# Patient Record
Sex: FEMALE | Race: WHITE | NOT HISPANIC OR LATINO | Employment: FULL TIME | URBAN - METROPOLITAN AREA
[De-identification: names, ages, dates, MRNs, and addresses within clinical notes are randomized per-mention and may not be internally consistent; named-entity substitution may affect disease eponyms.]

---

## 2024-06-27 ENCOUNTER — TRANSCRIBE ORDERS (OUTPATIENT)
Dept: ADMINISTRATIVE | Facility: HOSPITAL | Age: 44
End: 2024-06-27
Payer: COMMERCIAL

## 2024-06-27 DIAGNOSIS — N63.24 MASS OF LOWER INNER QUADRANT OF LEFT BREAST: Primary | ICD-10-CM

## 2024-06-28 ENCOUNTER — HOSPITAL ENCOUNTER (OUTPATIENT)
Dept: ULTRASOUND IMAGING | Facility: HOSPITAL | Age: 44
Discharge: HOME | End: 2024-06-28
Payer: COMMERCIAL

## 2024-06-28 ENCOUNTER — HOSPITAL ENCOUNTER (OUTPATIENT)
Dept: MAMMOGRAPHY | Facility: HOSPITAL | Age: 44
End: 2024-06-28
Payer: COMMERCIAL

## 2024-06-28 ENCOUNTER — HOSPITAL ENCOUNTER (OUTPATIENT)
Dept: MAMMOGRAPHY | Facility: HOSPITAL | Age: 44
Discharge: HOME | End: 2024-06-28
Payer: COMMERCIAL

## 2024-06-28 DIAGNOSIS — N63.24 MASS OF LOWER INNER QUADRANT OF LEFT BREAST: ICD-10-CM

## 2024-06-28 DIAGNOSIS — N63.24 UNSPECIFIED LUMP IN THE LEFT BREAST, LOWER INNER QUADRANT: ICD-10-CM

## 2024-06-28 PROCEDURE — 76642 ULTRASOUND BREAST LIMITED: CPT

## 2024-06-28 PROCEDURE — 77066 DX MAMMO INCL CAD BI: CPT

## 2024-06-28 PROCEDURE — G0279 TOMOSYNTHESIS, MAMMO: HCPCS

## 2024-11-27 ENCOUNTER — LAB (OUTPATIENT)
Dept: LAB | Facility: HOSPITAL | Age: 44
End: 2024-11-27
Payer: COMMERCIAL

## 2024-11-27 ENCOUNTER — OFFICE VISIT (OUTPATIENT)
Dept: FAMILY MEDICINE CLINIC | Facility: CLINIC | Age: 44
End: 2024-11-27
Payer: COMMERCIAL

## 2024-11-27 VITALS
WEIGHT: 293 LBS | OXYGEN SATURATION: 98 % | SYSTOLIC BLOOD PRESSURE: 116 MMHG | HEART RATE: 91 BPM | HEIGHT: 69 IN | DIASTOLIC BLOOD PRESSURE: 62 MMHG | BODY MASS INDEX: 43.4 KG/M2

## 2024-11-27 DIAGNOSIS — I10 HYPERTENSION, UNSPECIFIED TYPE: Primary | ICD-10-CM

## 2024-11-27 DIAGNOSIS — E66.813 CLASS 3 SEVERE OBESITY WITH SERIOUS COMORBIDITY AND BODY MASS INDEX (BMI) OF 40.0 TO 44.9 IN ADULT, UNSPECIFIED OBESITY TYPE: ICD-10-CM

## 2024-11-27 DIAGNOSIS — N63.20 MASS OF LEFT BREAST, UNSPECIFIED QUADRANT: ICD-10-CM

## 2024-11-27 DIAGNOSIS — R73.03 PREDIABETES: ICD-10-CM

## 2024-11-27 DIAGNOSIS — G89.29 OTHER CHRONIC PAIN: ICD-10-CM

## 2024-11-27 DIAGNOSIS — E66.01 CLASS 3 SEVERE OBESITY WITH SERIOUS COMORBIDITY AND BODY MASS INDEX (BMI) OF 40.0 TO 44.9 IN ADULT, UNSPECIFIED OBESITY TYPE: ICD-10-CM

## 2024-11-27 DIAGNOSIS — N64.4 BREAST TENDERNESS IN FEMALE: ICD-10-CM

## 2024-11-27 PROBLEM — E11.9 TYPE 2 DIABETES MELLITUS WITHOUT COMPLICATION: Status: ACTIVE | Noted: 2024-11-27

## 2024-11-27 LAB — HBA1C MFR BLD: 5.7 % (ref 4.8–5.6)

## 2024-11-27 PROCEDURE — 83036 HEMOGLOBIN GLYCOSYLATED A1C: CPT

## 2024-11-27 PROCEDURE — 36415 COLL VENOUS BLD VENIPUNCTURE: CPT

## 2024-11-27 RX ORDER — IBUPROFEN 800 MG/1
800 TABLET, FILM COATED ORAL EVERY 8 HOURS PRN
Qty: 30 TABLET | Refills: 1 | Status: SHIPPED | OUTPATIENT
Start: 2024-11-27

## 2024-11-27 RX ORDER — HYDROCHLOROTHIAZIDE 25 MG/1
25 TABLET ORAL DAILY
COMMUNITY
Start: 2024-07-23 | End: 2025-01-16

## 2024-11-27 RX ORDER — LORATADINE 10 MG/1
1 TABLET ORAL DAILY
COMMUNITY

## 2024-11-27 RX ORDER — LISINOPRIL 40 MG/1
40 TABLET ORAL DAILY
COMMUNITY
Start: 2024-10-18

## 2024-11-27 NOTE — ASSESSMENT & PLAN NOTE
Patient's (Body mass index is 44.6 kg/m².) indicates that they are morbidly/severely obese (BMI > 40 or > 35 with obesity - related health condition) with health conditions that include hypertension . Weight is improving with lifestyle modifications. BMI  is above average; BMI management plan is completed. We discussed portion control and increasing exercise.

## 2024-11-27 NOTE — PROGRESS NOTES
Chief Complaint  Establish Care and Hypertension    SUBJECTIVE  Abigail Jaimes presents to [facility] to establish care, pt previously seen [facility].     Patient has history of hypertension, well-controlled.      Pt states she has been dieting for the last approx 3 months and has lost about 26lbs. she started this after being told that she was prediabetic, requesting to recheck lab    Pt states that she had first mammo- diagnostic in June of this year, after finding a nodule while doing self breast exam, was negative but has had no further follow-up.  Patient notes that she has not checked to see if the lump has resolved because she has not really thought about it since she was told the mammogram was negative.    Patient requesting refill of ibuprofen 800 mg as needed, states she does not take this very often, only when working and typically only takes once daily.  Once she has been on her feet working for several hours she will start to have some joint pain    History of Present Illness  Past Medical History:   Diagnosis Date    Anxiety     Depression     Hypertension     Varicose vein of leg       Family History   Problem Relation Age of Onset    No Known Problems Mother     No Known Problems Father     No Known Problems Sister     No Known Problems Brother     No Known Problems Son     No Known Problems Daughter     No Known Problems Maternal Grandmother     No Known Problems Maternal Grandfather     No Known Problems Paternal Grandmother     No Known Problems Paternal Grandfather     No Known Problems Cousin     No Known Problems Other     Rheum arthritis Neg Hx     Osteoarthritis Neg Hx     Asthma Neg Hx     Diabetes Neg Hx     Heart failure Neg Hx     Hyperlipidemia Neg Hx     Hypertension Neg Hx     Migraines Neg Hx     Rashes / Skin problems Neg Hx     Seizures Neg Hx     Stroke Neg Hx     Thyroid disease Neg Hx       Past Surgical History:   Procedure Laterality  "Date    DENTAL PROCEDURE      HEMORRHOIDECTOMY      HYSTERECTOMY  2013    HYSTERECTOMY      STOMACH SURGERY          Current Outpatient Medications:     Cholecalciferol (vitamin D3) 125 MCG (5000 UT) tablet tablet, Take 1 tablet by mouth Daily., Disp: , Rfl:     hydroCHLOROthiazide 25 MG tablet, Take 1 tablet by mouth Daily., Disp: , Rfl:     lisinopril (PRINIVIL,ZESTRIL) 40 MG tablet, Take 1 tablet by mouth Daily., Disp: , Rfl:     loratadine (Claritin) 10 MG tablet, Take 1 tablet by mouth Daily., Disp: , Rfl:     ibuprofen (ADVIL,MOTRIN) 800 MG tablet, Take 1 tablet by mouth Every 8 (Eight) Hours As Needed for Moderate Pain., Disp: 30 tablet, Rfl: 1    OBJECTIVE  Vital Signs:   /62   Pulse 91   Ht 175.3 cm (69\")   Wt (!) 137 kg (302 lb)   SpO2 98%   BMI 44.60 kg/m²    Estimated body mass index is 44.6 kg/m² as calculated from the following:    Height as of this encounter: 175.3 cm (69\").    Weight as of this encounter: 137 kg (302 lb).     Wt Readings from Last 3 Encounters:   11/27/24 (!) 137 kg (302 lb)   11/28/22 130 kg (287 lb 6.4 oz)   02/11/22 (!) 142 kg (313 lb)     BP Readings from Last 3 Encounters:   11/27/24 116/62   11/28/22 148/90   02/09/22 (!) 163/102       Physical Exam  Vitals reviewed.   Constitutional:       Appearance: Normal appearance. She is well-developed.   HENT:      Head: Normocephalic and atraumatic.      Right Ear: External ear normal.      Left Ear: External ear normal.   Eyes:      Conjunctiva/sclera: Conjunctivae normal.      Pupils: Pupils are equal, round, and reactive to light.   Cardiovascular:      Rate and Rhythm: Normal rate and regular rhythm.      Heart sounds: No murmur heard.     No friction rub. No gallop.   Pulmonary:      Effort: Pulmonary effort is normal.      Breath sounds: Normal breath sounds. No wheezing or rhonchi.   Chest:      Comments: 2x 2 cm nodule palpable at approximately 9:00 left breast, tender to palpation  Skin:     General: Skin is warm " and dry.   Neurological:      Mental Status: She is alert and oriented to person, place, and time.      Cranial Nerves: No cranial nerve deficit.   Psychiatric:         Mood and Affect: Mood and affect normal.         Behavior: Behavior normal.         Thought Content: Thought content normal.         Judgment: Judgment normal.          Result Review              No Images in the past 120 days found..     The above data has been reviewed by ELISEO Miguel 11/27/2024 11:09 EST.          Patient Care Team:  Shamika Mcdonald APRN as PCP - General (Nurse Practitioner)  [facility]uf[facility] Carrillo (Pharmacy)       ASSESSMENT & PLAN    Diagnoses and all orders for this visit:    1. Hypertension, unspecified type (Primary)  Assessment & Plan:  Stable well-controlled at present, continue current medication      2. Prediabetes  -     Hemoglobin A1c; Future    3. Other chronic pain  Overview:  Stable and well-controlled with as needed use of ibuprofen, continue current medication    Orders:  -     ibuprofen (ADVIL,MOTRIN) 800 MG tablet; Take 1 tablet by mouth Every 8 (Eight) Hours As Needed for Moderate Pain.  Dispense: 30 tablet; Refill: 1    4. Class 3 severe obesity with serious comorbidity and body mass index (BMI) of 40.0 to 44.9 in adult, unspecified obesity type  Assessment & Plan:  Patient's (Body mass index is 44.6 kg/m².) indicates that they are morbidly/severely obese (BMI > 40 or > 35 with obesity - related health condition) with health conditions that include hypertension . Weight is improving with lifestyle modifications. BMI  is above average; BMI management plan is completed. We discussed portion control and increasing exercise.       5. Mass of left breast, unspecified quadrant  -     Mammo Diagnostic Digital Tomosynthesis Left With CAD; Future  -     US Breast Left Limited; Future    6. Breast tenderness in female  -     Mammo Diagnostic Digital Tomosynthesis Left With CAD; Future  -     US Breast Left  Limited; Future    After evaluating breast nodule patient very concerned as she reports the nodule was not previously tender and is now quite tender to touch, after discussion, given the changes we have opted to proceed with imaging for further evaluation, discussed even if imaging is normal given that this is a palpable abnormality I would recommend follow-up with breast surgeon, patient verbalized understanding, awaiting results of imaging    Tobacco Use: Medium Risk (11/27/2024)    Patient History     Smoking Tobacco Use: Former     Smokeless Tobacco Use: Never     Passive Exposure: Past       Follow Up     Return in 4 weeks (on 12/25/2024), or if symptoms worsen or fail to improve.        Patient was given instructions and counseling regarding her condition or for health maintenance advice. Please see specific information pulled into the AVS if appropriate.   I have reviewed information obtained and documented by others and I have confirmed the accuracy of this documented note.    Shamika Mcdonald, ELISEO

## 2024-12-11 ENCOUNTER — TELEPHONE (OUTPATIENT)
Dept: FAMILY MEDICINE CLINIC | Facility: CLINIC | Age: 44
End: 2024-12-11
Payer: COMMERCIAL

## 2024-12-11 NOTE — TELEPHONE ENCOUNTER
Pt states its the same breast, it hurts where that lump is. It is effecting her pushing and pulling at her job

## 2024-12-11 NOTE — TELEPHONE ENCOUNTER
Need clarification, is patient saying that she is now having pain in both breasts?  If yes we need to add a diagnostic mammogram/ultrasound for the right breast as well   negative...

## 2024-12-11 NOTE — TELEPHONE ENCOUNTER
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 SHE WOULD LIKE CLINICAL ADVICE ON WHAT TO DO.

## 2024-12-20 ENCOUNTER — HOSPITAL ENCOUNTER (OUTPATIENT)
Dept: MAMMOGRAPHY | Facility: HOSPITAL | Age: 44
Discharge: HOME | End: 2024-12-20
Payer: COMMERCIAL

## 2024-12-20 ENCOUNTER — HOSPITAL ENCOUNTER (OUTPATIENT)
Dept: ULTRASOUND IMAGING | Facility: HOSPITAL | Age: 44
Discharge: HOME | End: 2024-12-20
Payer: COMMERCIAL

## 2024-12-20 DIAGNOSIS — N64.4 BREAST TENDERNESS IN FEMALE: ICD-10-CM

## 2024-12-20 DIAGNOSIS — N63.20 MASS OF LEFT BREAST, UNSPECIFIED QUADRANT: ICD-10-CM

## 2024-12-20 PROCEDURE — G0279 TOMOSYNTHESIS, MAMMO: HCPCS

## 2024-12-20 PROCEDURE — 76642 ULTRASOUND BREAST LIMITED: CPT

## 2024-12-20 PROCEDURE — 77065 DX MAMMO INCL CAD UNI: CPT

## 2024-12-23 DIAGNOSIS — N64.4 BREAST TENDERNESS IN FEMALE: ICD-10-CM

## 2024-12-23 DIAGNOSIS — N63.20 MASS OF LEFT BREAST, UNSPECIFIED QUADRANT: Primary | ICD-10-CM

## 2025-01-10 NOTE — TELEPHONE ENCOUNTER
Caller: Abigail Jaimes    Relationship: Self    Best call back number: [phone]    Requested Prescriptions:   Requested Prescriptions     Pending Prescriptions Disp Refills    loratadine (Claritin) 10 MG tablet       Sig: Take 1 tablet by mouth Daily.    lisinopril (PRINIVIL,ZESTRIL) 40 MG tablet       Sig: Take 1 tablet by mouth Daily.    hydroCHLOROthiazide 25 MG tablet 30 tablet 5     Sig: Take 1 tablet by mouth Daily for 177 days.    Cholecalciferol (vitamin D3) 125 MCG (5000 UT) tablet tablet 30 tablet      Sig: Take 1 tablet by mouth Daily.        Pharm[facility] where request should be sent[facility] [place] [facility] [place] [facility] [address] [facility] [address] [phone] [facility] [phone] FX     Last office visit with prescribing clinician: 11/27/2024   Last telemedicine visit with prescribing clinician: Visit date not found   Next office visit with prescribing clinician: Visit date not found       Does the patient have less than a 3 day supply:  [] Yes  [x] No    Would you like a call back once the refill request has been completed: [] Yes [x] No    If the office needs to give you a call back, can they leave a voicemail: [] Yes [x] No    Jac Lakhani Rep   01/10/25 09:22 EST

## 2025-01-13 RX ORDER — LORATADINE 10 MG/1
10 TABLET ORAL DAILY
Qty: 30 TABLET | Refills: 1 | Status: SHIPPED | OUTPATIENT
Start: 2025-01-13

## 2025-01-13 RX ORDER — LISINOPRIL 40 MG/1
40 TABLET ORAL DAILY
Qty: 30 TABLET | Refills: 1 | Status: SHIPPED | OUTPATIENT
Start: 2025-01-13

## 2025-01-13 RX ORDER — HYDROCHLOROTHIAZIDE 25 MG/1
25 TABLET ORAL DAILY
Qty: 30 TABLET | Refills: 1 | Status: SHIPPED | OUTPATIENT
Start: 2025-01-13

## 2025-01-16 DIAGNOSIS — G89.29 OTHER CHRONIC PAIN: ICD-10-CM

## 2025-01-16 RX ORDER — IBUPROFEN 800 MG/1
800 TABLET, FILM COATED ORAL EVERY 8 HOURS PRN
Qty: 30 TABLET | Refills: 1 | Status: SHIPPED | OUTPATIENT
Start: 2025-01-16

## 2025-02-03 ENCOUNTER — HOSPITAL ENCOUNTER (EMERGENCY)
Facility: HOSPITAL | Age: 45
Discharge: HOME | End: 2025-02-04
Attending: EMERGENCY MEDICINE | Admitting: EMERGENCY MEDICINE
Payer: COMMERCIAL

## 2025-02-03 ENCOUNTER — APPOINTMENT (OUTPATIENT)
Dept: GENERAL RADIOLOGY | Facility: HOSPITAL | Age: 45
End: 2025-02-03
Payer: COMMERCIAL

## 2025-02-03 ENCOUNTER — APPOINTMENT (OUTPATIENT)
Dept: CT IMAGING | Facility: HOSPITAL | Age: 45
End: 2025-02-03
Payer: COMMERCIAL

## 2025-02-03 DIAGNOSIS — R06.02 SOB (SHORTNESS OF BREATH): ICD-10-CM

## 2025-02-03 DIAGNOSIS — K59.00 CONSTIPATION, UNSPECIFIED CONSTIPATION TYPE: Primary | ICD-10-CM

## 2025-02-03 DIAGNOSIS — M54.6 THORACIC BACK PAIN, UNSPECIFIED BACK PAIN LATERALITY, UNSPECIFIED CHRONICITY: ICD-10-CM

## 2025-02-03 LAB
ALBUMIN SERPL-MCNC: 4.1 G/DL (ref 3.5–5.2)
ALBUMIN/GLOB SERPL: 1.1 G/DL
ALP SERPL-CCNC: 139 U/L (ref 39–117)
ALT SERPL W P-5'-P-CCNC: 25 U/L (ref 1–33)
ANION GAP SERPL CALCULATED.3IONS-SCNC: 10.7 MMOL/L (ref 5–15)
AST SERPL-CCNC: 23 U/L (ref 1–32)
BASOPHILS # BLD AUTO: 0.02 10*3/MM3 (ref 0–0.2)
BASOPHILS NFR BLD AUTO: 0.2 % (ref 0–1.5)
BILIRUB SERPL-MCNC: 0.6 MG/DL (ref 0–1.2)
BILIRUB UR QL STRIP: NEGATIVE
BUN SERPL-MCNC: 18 MG/DL (ref 6–20)
BUN/CREAT SERPL: 16.4 (ref 7–25)
CALCIUM SPEC-SCNC: 9.6 MG/DL (ref 8.6–10.5)
CHLORIDE SERPL-SCNC: 100 MMOL/L (ref 98–107)
CLARITY UR: CLEAR
CO2 SERPL-SCNC: 27.3 MMOL/L (ref 22–29)
COLOR UR: YELLOW
CREAT SERPL-MCNC: 1.1 MG/DL (ref 0.57–1)
DEPRECATED RDW RBC AUTO: 42.7 FL (ref 37–54)
EGFRCR SERPLBLD CKD-EPI 2021: 64.1 ML/MIN/1.73
EOSINOPHIL # BLD AUTO: 0.12 10*3/MM3 (ref 0–0.4)
EOSINOPHIL NFR BLD AUTO: 1.1 % (ref 0.3–6.2)
ERYTHROCYTE [DISTWIDTH] IN BLOOD BY AUTOMATED COUNT: 13.8 % (ref 12.3–15.4)
FLUAV SUBTYP SPEC NAA+PROBE: NOT DETECTED
FLUBV RNA ISLT QL NAA+PROBE: NOT DETECTED
GLOBULIN UR ELPH-MCNC: 3.7 GM/DL
GLUCOSE SERPL-MCNC: 126 MG/DL (ref 65–99)
GLUCOSE UR STRIP-MCNC: NEGATIVE MG/DL
HCT VFR BLD AUTO: 42.1 % (ref 34–46.6)
HGB BLD-MCNC: 13.2 G/DL (ref 12–15.9)
HGB UR QL STRIP.AUTO: NEGATIVE
HOLD SPECIMEN: NORMAL
HOLD SPECIMEN: NORMAL
IMM GRANULOCYTES # BLD AUTO: 0.03 10*3/MM3 (ref 0–0.05)
IMM GRANULOCYTES NFR BLD AUTO: 0.3 % (ref 0–0.5)
KETONES UR QL STRIP: NEGATIVE
LEUKOCYTE ESTERASE UR QL STRIP.AUTO: NEGATIVE
LIPASE SERPL-CCNC: 44 U/L (ref 13–60)
LYMPHOCYTES # BLD AUTO: 2.05 10*3/MM3 (ref 0.7–3.1)
LYMPHOCYTES NFR BLD AUTO: 18.5 % (ref 19.6–45.3)
MCH RBC QN AUTO: 26.6 PG (ref 26.6–33)
MCHC RBC AUTO-ENTMCNC: 31.4 G/DL (ref 31.5–35.7)
MCV RBC AUTO: 84.7 FL (ref 79–97)
MONOCYTES # BLD AUTO: 0.77 10*3/MM3 (ref 0.1–0.9)
MONOCYTES NFR BLD AUTO: 7 % (ref 5–12)
NEUTROPHILS NFR BLD AUTO: 72.9 % (ref 42.7–76)
NEUTROPHILS NFR BLD AUTO: 8.07 10*3/MM3 (ref 1.7–7)
NITRITE UR QL STRIP: NEGATIVE
NRBC BLD AUTO-RTO: 0 /100 WBC (ref 0–0.2)
PH UR STRIP.AUTO: 6.5 [PH] (ref 5–8)
PLATELET # BLD AUTO: 275 10*3/MM3 (ref 140–450)
PMV BLD AUTO: 11.4 FL (ref 6–12)
POTASSIUM SERPL-SCNC: 3.3 MMOL/L (ref 3.5–5.2)
PROT SERPL-MCNC: 7.8 G/DL (ref 6–8.5)
PROT UR QL STRIP: NEGATIVE
RBC # BLD AUTO: 4.97 10*6/MM3 (ref 3.77–5.28)
RSV RNA NPH QL NAA+NON-PROBE: NOT DETECTED
SARS-COV-2 RNA RESP QL NAA+PROBE: NOT DETECTED
SODIUM SERPL-SCNC: 138 MMOL/L (ref 136–145)
SP GR UR STRIP: 1.01 (ref 1–1.03)
UROBILINOGEN UR QL STRIP: NORMAL
WBC NRBC COR # BLD AUTO: 11.06 10*3/MM3 (ref 3.4–10.8)
WHOLE BLOOD HOLD COAG: NORMAL
WHOLE BLOOD HOLD SPECIMEN: NORMAL

## 2025-02-03 PROCEDURE — 99285 EMERGENCY DEPT VISIT HI MDM: CPT

## 2025-02-03 PROCEDURE — 96374 THER/PROPH/DIAG INJ IV PUSH: CPT

## 2025-02-03 PROCEDURE — 83690 ASSAY OF LIPASE: CPT

## 2025-02-03 PROCEDURE — 87637 SARSCOV2&INF A&B&RSV AMP PRB: CPT

## 2025-02-03 PROCEDURE — 74018 RADEX ABDOMEN 1 VIEW: CPT

## 2025-02-03 PROCEDURE — 25010000002 KETOROLAC TROMETHAMINE PER 15 MG

## 2025-02-03 PROCEDURE — 96375 TX/PRO/DX INJ NEW DRUG ADDON: CPT

## 2025-02-03 PROCEDURE — 81003 URINALYSIS AUTO W/O SCOPE: CPT

## 2025-02-03 PROCEDURE — 80053 COMPREHEN METABOLIC PANEL: CPT

## 2025-02-03 PROCEDURE — 85025 COMPLETE CBC W/AUTO DIFF WBC: CPT

## 2025-02-03 PROCEDURE — 25010000002 ORPHENADRINE CITRATE PER 60 MG

## 2025-02-03 PROCEDURE — 36415 COLL VENOUS BLD VENIPUNCTURE: CPT

## 2025-02-03 RX ORDER — POTASSIUM CHLORIDE 750 MG/1
40 CAPSULE, EXTENDED RELEASE ORAL ONCE
Status: COMPLETED | OUTPATIENT
Start: 2025-02-03 | End: 2025-02-03

## 2025-02-03 RX ORDER — SODIUM CHLORIDE 0.9 % (FLUSH) 0.9 %
10 SYRINGE (ML) INJECTION AS NEEDED
Status: DISCONTINUED | OUTPATIENT
Start: 2025-02-03 | End: 2025-02-04 | Stop reason: HOSPADM

## 2025-02-03 RX ORDER — MAGNESIUM CARB/ALUMINUM HYDROX 105-160MG
150 TABLET,CHEWABLE ORAL ONCE
Status: COMPLETED | OUTPATIENT
Start: 2025-02-03 | End: 2025-02-03

## 2025-02-03 RX ORDER — ORPHENADRINE CITRATE 30 MG/ML
60 INJECTION INTRAMUSCULAR; INTRAVENOUS ONCE
Status: COMPLETED | OUTPATIENT
Start: 2025-02-03 | End: 2025-02-03

## 2025-02-03 RX ORDER — KETOROLAC TROMETHAMINE 30 MG/ML
30 INJECTION, SOLUTION INTRAMUSCULAR; INTRAVENOUS ONCE
Status: COMPLETED | OUTPATIENT
Start: 2025-02-03 | End: 2025-02-03

## 2025-02-03 RX ADMIN — POTASSIUM CHLORIDE 40 MEQ: 750 CAPSULE, EXTENDED RELEASE ORAL at 21:37

## 2025-02-03 RX ADMIN — KETOROLAC TROMETHAMINE 30 MG: 30 INJECTION, SOLUTION INTRAMUSCULAR; INTRAVENOUS at 21:37

## 2025-02-03 RX ADMIN — ORPHENADRINE CITRATE 60 MG: 60 INJECTION INTRAMUSCULAR; INTRAVENOUS at 21:37

## 2025-02-03 RX ADMIN — MAJOR MAGNESIUM CITRATE ORAL SOLUTION - LEMON 150 ML: 1.75 LIQUID ORAL at 23:26

## 2025-02-03 NOTE — ED PROVIDER NOTES
Time: 6:06 PM EST  Date of encounter:  2/3/2025  Independent Historian/Clinical History and Information was obtained by:   Patient    History is limited by: N/A    Chief Complaint   Patient presents with    Back Pain    Abdominal Pain         History of Present Illness:  Patient is a 44 y.o. year old female who presents to the emergency department for evaluation of back pain and abdominal pain.  Patient states that the abdominal pain is in her right and left lower quadrants.  Patient states that she feels like the pain is radiating into her mid back.  Patient has been constipated over the past couple days but took some MiraLAX and had a bowel movement that was claylike in consistency.  Patient is taking no medication for pain.(Bailey Seaver, APRN, FNP-C)    Patient states the back pain started today.  States that his bilateral upper back.  Worsening with deep breaths.  Patient recently went on a cruise and had to take a flight prior to the cruise.  He states she has not taken anything for the pain.  She states it does not feel like musculoskeletal pain.  Patient also has complaints of constipation.  States she has not had a bowel movement in 3 to 4 days.  Took 1 dose of Metamucil earlier today and then states she had cindy colored stools that are soft.  She denies vomiting, fever or cough.    Patient Care Team  Primary Care Provider: Shamika Mcdonald APRN    Past Medical History:     No Known Allergies  Past Medical History:   Diagnosis Date    Anxiety     Depression     Hypertension     Varicose vein of leg      Past Surgical History:   Procedure Laterality Date    DENTAL PROCEDURE      HEMORRHOIDECTOMY      HYSTERECTOMY  2013    HYSTERECTOMY      STOMACH SURGERY       Family History   Problem Relation Age of Onset    No Known Problems Mother     No Known Problems Father     No Known Problems Sister     No Known Problems Brother     No Known Problems Son     No Known Problems Daughter     No Known Problems  Maternal Grandmother     No Known Problems Maternal Grandfather     No Known Problems Paternal Grandmother     No Known Problems Paternal Grandfather     No Known Problems Cousin     No Known Problems Other     Rheum arthritis Neg Hx     Osteoarthritis Neg Hx     Asthma Neg Hx     Diabetes Neg Hx     Heart failure Neg Hx     Hyperlipidemia Neg Hx     Hypertension Neg Hx     Migraines Neg Hx     Rashes / Skin problems Neg Hx     Seizures Neg Hx     Stroke Neg Hx     Thyroid disease Neg Hx        Home Medications:  Prior to Admission medications    Medication Sig Start Date End Date Taking? Authorizing Provider   Cholecalciferol (vitamin D3) 125 MCG (5000 UT) tablet tablet Take 1 tablet by mouth Daily. 1/13/25   Shamika Mcdonald APRN   hydroCHLOROthiazide 25 MG tablet Take 1 tablet by mouth Daily. 1/13/25   Shamika Mcdonald APRN   ibuprofen (ADVIL,MOTRIN) 800 MG tablet TAKE ONE TABLET BY MOUTH EVERY 8 HOURS AS NEEDED FOR MODERATE PAIN 1/16/25   Shamika Mcdonald APRN   lisinopril (PRINIVIL,ZESTRIL) 40 MG tablet Take 1 tablet by mouth Daily. 1/13/25   Shamika Mcdonald APRN   loratadine (Claritin) 10 MG tablet Take 1 tablet by mouth Daily. 1/13/25   Shamika Mcdonald APRN        Social History:   Social History     Tobacco Use    Smoking status: Former     Types: Cigarettes     Passive exposure: Past    Smokeless tobacco: Never   Vaping Use    Vaping status: Every Day    Substances: Nicotine   Substance Use Topics    Alcohol use: Not Currently     Comment: IN RECOVERY    Drug use: Not Currently     Types: Methamphetamines     Comment: IN RECOVERY         Review of Systems:  Review of Systems   Respiratory:  Positive for shortness of breath.    Gastrointestinal:  Positive for abdominal pain and constipation. Negative for nausea and vomiting.   Musculoskeletal:  Positive for back pain.        Physical Exam:  /94 (Patient Position: Lying)   Pulse 75   Temp 97.7 °F (36.5 °C) (Oral)   Resp 20   Ht  "175.3 cm (69\")   Wt (!) 140 kg (309 lb 1.4 oz)   SpO2 98%   BMI 45.64 kg/m²         Physical Exam  Vitals and nursing note reviewed.   Constitutional:       General: She is not in acute distress.     Appearance: Normal appearance. She is obese. She is not ill-appearing, toxic-appearing or diaphoretic.   HENT:      Head: Normocephalic and atraumatic.      Nose: Nose normal.      Mouth/Throat:      Mouth: Mucous membranes are moist.   Eyes:      Extraocular Movements: Extraocular movements intact.      Conjunctiva/sclera: Conjunctivae normal.      Pupils: Pupils are equal, round, and reactive to light.   Cardiovascular:      Rate and Rhythm: Normal rate and regular rhythm.      Heart sounds: Normal heart sounds.   Pulmonary:      Effort: Pulmonary effort is normal. No tachypnea, accessory muscle usage or respiratory distress.      Breath sounds: Normal breath sounds. No decreased breath sounds, wheezing or rhonchi.   Abdominal:      General: Abdomen is flat. Bowel sounds are normal. There is no distension.      Palpations: Abdomen is soft.      Tenderness: There is no abdominal tenderness. There is no right CVA tenderness, left CVA tenderness, guarding or rebound.   Musculoskeletal:         General: Normal range of motion.      Cervical back: Normal, normal range of motion and neck supple.      Thoracic back: Normal.      Lumbar back: Normal.        Back:    Skin:     General: Skin is warm and dry.   Neurological:      General: No focal deficit present.      Mental Status: She is alert and oriented to person, place, and time.   Psychiatric:         Mood and Affect: Mood normal.         Behavior: Behavior normal.                        Medical Decision Making:      Comorbidities that affect care:    Hypertension, Obesity    External Notes reviewed:    Previous Clinic Note: Office visit November 27, 2024      The following orders were placed and all results were independently analyzed by me:  Orders Placed This " Encounter   Procedures    COVID-19, FLU A/B, RSV PCR 1 HR TAT - Swab, Nasopharynx    XR Abdomen KUB    CT Angiogram Chest Pulmonary Embolism    [facility] Draw    Comprehensive Metabolic Panel    Lipase    Urinalysis With Microscopic If Indicated (No Culture) - Urine, Clean Catch    CBC Auto Differential    Ambulatory Referral to Pulmonology    NPO Diet NPO Type: Strict NPO    Undress & Gown    Insert Peripheral IV    CBC & Differential    Green Top (Gel)    Lavender Top    Gold Top - SST    Light Blue Top       Medications Given in the Emergency Department:  Medications   sodium chloride 0.9 % flush 10 mL (10 mL Intravenous Given 2/4/25 0213)   docusate sodium (ENEMEEZ) enema 1 enema (has no administration in time range)   potassium chloride (MICRO-K/KLOR-CON) CR capsule (40 mEq Oral Given 2/3/25 2137)   ketorolac (TORADOL) injection 30 mg (30 mg Intravenous Given 2/3/25 2137)   orphenadrine (NORFLEX) injection 60 mg (60 mg Intravenous Given 2/3/25 2137)   magnesium citrate solution 150 mL (150 mL Oral Given 2/3/25 2326)   iopamidol (ISOVUE-370) 76 % injection 100 mL (100 mL Intravenous Given 2/4/25 0150)   cyclobenzaprine (FLEXERIL) tablet 10 mg (10 mg Oral Given 2/4/25 0212)   ketorolac (TORADOL) injection 30 mg (30 mg Intravenous Given 2/4/25 0212)        ED Course:    The patient was initially evaluated in the triage area where orders were placed. The patient was later dispositioned by Adelfo Salas PA-C.      The patient was advised to stay for completion of workup which includes but is not limited to communication of labs and radiological results, reassessment and plan. The patient was advised that leaving prior to disposition by a provider could result in critical findings that are not communicated to the patient.     ED Course as of 02/04/25 0258 Mon Feb 03, 2025 1807 PROVIDER IN TRIAGE  Patient was evaluated by me in triage, Bailey Seaver, APRN, FNP-C.  Orders were placed and patient is currently  awaiting final results and disposition.   [AS]   Tue Feb 04, 2025 0229 Dr. Abbott, radiologist called and states patient does not have any saddle emboli but possible right upper tiny emboli.  If kidney function adequate can reCT patient first treat for PE. [AJ]      ED Course User Index  [AJ] Adelfo Salas PA-C  [AS] Seaver, Alyce B, ELISEO       Labs:    Lab Results (last 24 hours)       Procedure Component Value Units Date/Time    CBC & Differential [[id number]]  (Abnormal) Collected: 02/03/25 1816    Specimen: Blood Updated: 02/03/25 1827    Narrative:      The following orders were created for panel order CBC & Differential.  Procedure                               Abnormality         Status                     ---------                               -----------         ------                     CBC Auto Differential[[id number]]        Abnormal            Final result                 Please view results for these tests on the individual orders.    Comprehensive Metabolic Panel [[id number]]  (Abnormal) Collected: 02/03/25 1816    Specimen: Blood Updated: 02/03/25 1851     Glucose 126 mg/dL      BUN 18 mg/dL      Creatinine 1.10 mg/dL      Sodium 138 mmol/L      Potassium 3.3 mmol/L      Chloride 100 mmol/L      CO2 27.3 mmol/L      Calcium 9.6 mg/dL      Total Protein 7.8 g/dL      Albumin 4.1 g/dL      ALT (SGPT) 25 U/L      AST (SGOT) 23 U/L      Alkaline Phosphatase 139 U/L      Total Bilirubin 0.6 mg/dL      Globulin 3.7 gm/dL      A/G Ratio 1.1 g/dL      BUN/Creatinine Ratio 16.4     Anion Gap 10.7 mmol/L      eGFR 64.1 mL/min/1.73     Narrative:      GFR Categories in Chronic Kidney Disease (CKD)      GFR Category          GFR (mL/min/1.73)    Interpretation  G1                     90 or greater         Normal or high (1)  G2                      60-89                Mild decrease (1)  G3a                   45-59                Mild to moderate decrease  G3b                   30-44                 Moderate to severe decrease  G4                    15-29                Severe decrease  G5                    14 or less           Kidney failure          (1)In the absence of evidence of kidney disease, neither GFR category G1 or G2 fulfill the criteria for CKD.    eGFR calculation 2021 CKD-EPI creatinine equation, which does not include race as a factor    Lipase [[id number]]  (Normal) Collected: 02/03/25 1816    Specimen: Blood Updated: 02/03/25 1851     Lipase 44 U/L     CBC Auto Differential [[id number]]  (Abnormal) Collected: 02/03/25 1816    Specimen: Blood Updated: 02/03/25 1827     WBC 11.06 10*3/mm3      RBC 4.97 10*6/mm3      Hemoglobin 13.2 g/dL      Hematocrit 42.1 %      MCV 84.7 fL      MCH 26.6 pg      MCHC 31.4 g/dL      RDW 13.8 %      RDW-SD 42.7 fl      MPV 11.4 fL      Platelets 275 10*3/mm3      Neutrophil % 72.9 %      Lymphocyte % 18.5 %      Monocyte % 7.0 %      Eosinophil % 1.1 %      Basophil % 0.2 %      Immature Grans % 0.3 %      Neutrophils, Absolute 8.07 10*3/mm3      Lymphocytes, Absolute 2.05 10*3/mm3      Monocytes, Absolute 0.77 10*3/mm3      Eosinophils, Absolute 0.12 10*3/mm3      Basophils, Absolute 0.02 10*3/mm3      Immature Grans, Absolute 0.03 10*3/mm3      nRBC 0.0 /100 WBC     Urinalysis With Microscopic If Indicated (No Culture) - Urine, Clean Catch [[id number]]  (Normal) Collected: 02/03/25 1820    Specimen: Urine, Clean Catch Updated: 02/03/25 1830     Color, UA Yellow     Appearance, UA Clear     pH, UA 6.5     Specific Gravity, UA 1.012     Glucose, UA Negative     Ketones, UA Negative     Bilirubin, UA Negative     Blood, UA Negative     Protein, UA Negative     Leuk Esterase, UA Negative     Nitrite, UA Negative     Urobilinogen, UA 1.0 E.U./dL    Narrative:      Urine microscopic not indicated.    COVID-19, FLU A/B, RSV PCR 1 HR TAT - Swab, Nasopharynx [[id number]]  (Normal) Collected: 02/03/25 2124    Specimen: Swab from Nasopharynx Updated: 02/03/25 2216      COVID19 Not Detected     Influenza A PCR Not Detected     Influenza B PCR Not Detected     RSV, PCR Not Detected    Narrative:      Fact sheet for providers: https://www.fda.gov/media/595078/download    Fact sheet for patients: https://www.fda.gov/media/507351/download    Test performed by PCR.             Imaging:    CT Angiogram Chest Pulmonary Embolism    Result Date: 2/4/2025  CT ANGIOGRAM CHEST PULMONARY EMBOLISM Date of Exam: 2/4/2025 1:33 AM EST Indication: sob/recent travel. Upper back pain. Comparison: None available. Technique: Axial CT images were obtained of the chest after the uneventful intravenous administration of iodinated contrast utilizing pulmonary embolism protocol.  Reconstructed coronal and sagittal images were also obtained. Automated exposure control and iterative construction methods were used. Findings: There is some artifact due to body habitus. Bolus timing is suboptimal as well. No central pulmonary emboli are identified. The subsegmental and more distal vessels are fairly poorly characterized on both sides of the chest. Small emboli in the right upper lobe cannot be completely excluded. If renal function permits, consider repeat exam or consider VQ scan. No aortic aneurysm or aortic dissection. No pleural or pericardial effusion. No adenopathy. No acute findings in the visualized upper abdomen. There are 2 adjacent subpleural nodules in the right lower lobe both measuring about 4 mm in size. The lungs are otherwise clear. No acute infiltrates. No pulmonary edema. No pneumothorax. No fracture or malalignment in the thoracic spine.     1. Suboptimal bolus timing complicated by body habitus. No central pulmonary emboli. Small right upper lobe pulmonary emboli cannot be completely excluded. Consider repeat exam if renal function permits or VQ scan if indicated. 2. No aortic aneurysm or dissection. 3. No acute pulmonary infiltrates. 4. 2 tiny right lower lobe pulmonary nodules.  Indeterminate solid pulmonary nodule measuring 4 mm. In a low-risk patient with a solid nodule <6 mm, recommend no follow-up. In a high-risk patient, CT at 12 months is optional with stronger consideration if there is suspicious nodule morphology and/or upper lobe location. NOTIFICATION: Critical Value/emergent results were called by telephone at the time of interpretation on 2/4/2025 2:29 AM EST to HEBERT MONTAGUE who verbally acknowledged these results. Electronically Signed: Marty Abbott MD  2/4/2025 2:29 AM EST  Workstation ID: [id number]    XR Abdomen KUB    Result Date: 2/3/2025  XR ABDOMEN KUB Date of Exam: 2/3/2025 6:49 PM EST Indication: constipation Comparison: None available. Findings: No evidence of bowel obstruction. Moderate colonic stool burden which can be seen with constipation. No acute osseous abnormality. No radiodense renal calculi appreciated. Lower lumbar spondylosis.     Impression: No evidence of bowel obstruction. Moderate colonic stool burden which can be seen with constipation. Electronically Signed: Julien Garcia MD  2/3/2025 7:58 PM EST  Workstation ID: [id number]       Differential Diagnosis and Discussion:      Abdominal Pain: Based on the patient's signs and symptoms, I considered abdominal aortic aneurysm, small bowel obstruction, pancreatitis, acute cholecystitis, acute appendecitis, peptic ulcer disease, gastritis, colitis, endocrine disorders, irritable bowel syndrome and other differential diagnosis an etiology of the patient's abdominal pain.  Back Pain: The patient presents with back pain. My differential diagnosis includes but is not limited to acute spinal epidural abscess, acute spinal epidural bleed, cauda equina syndrome, abdominal aortic aneurysm, aortic dissection, kidney stone, pyelonephritis, musculoskeletal back pain, spinal fracture, and osteoarthritis.   Dyspnea: Differential diagnosis includes but is not limited to metabolic acidosis, neurological disorders,  psychogenic, asthma, pneumothorax, upper airway obstruction, COPD, pneumonia, noncardiogenic pulmonary edema, interstitial lung disease, anemia, congestive heart failure, and pulmonary embolism    PROCEDURES:    Labs were collected in the emergency department and all labs were reviewed and interpreted by me.  X-ray were performed in the emergency department and all X-ray impressions were independently interpreted by me.  CT scan was performed in the emergency department and the CT scan radiology impression was interpreted by me.    No orders to display        Procedures    MDM     Amount and/or Complexity of Data Reviewed  Clinical lab tests: reviewed  Tests in the radiology section of CPT®: reviewed  Decide to obtain previous medical records or to obtain history from someone other than the patient: yes                     Patient Care Considerations:    ReCT and the patient.  However have discussed with the patient the findings of the original CT after discussing with Dr. Abbott and Dr. Ureña.  Patient is agreeable to hold off on CT and follow-up with PCP and return if any worsening symptoms.      Consultants/Shared Management Plan:    SHARED VISIT: I have discussed the case with my supervising physician, [facility] The substantive portion of the medical decision was made by the attesting physician who made or approve the management plan and will take responsibility for the patient.  Clinical findings were discussed and ultimate disposition was made in consult with supervising physician.    Social Determinants of Health:    Patient is independent, reliable, and has access to care.       Disposition and Care Coordination:    Discharged: I considered escalation of care by admitting this patient to the hospital, however patient is not hypoxic.  CTA negative for large emboli    I have explained the patient´s condition, diagnoses and treatment plan based on the information available  to me at this time. I have answered questions and addressed any concerns. The patient has a good  understanding of the patient´s diagnosis, condition, and treatment plan as can be expected at this point. The vital signs have been stable. The patient´s condition is stable and appropriate for discharge from the emergency department.      The patient will pursue further outpatient evaluation with the primary care physician or other designated or consulting physician as outlined in the discharge instructions. They are agreeable to this plan of care and follow-up instructions have been explained in detail. The patient has received these instructions in written format and has expressed an understanding of the discharge instructions. The patient is aware that any significant change in condition or worsening of symptoms should prompt an immediate return to this or the closest emergency department or call to 911.  I have explained discharge medications and the need for follow up with the patient/caretakers. This was also printed in the discharge instructions. Patient was discharged with the following medications and follow up:      Medication List        New Prescriptions      ketorolac 10 MG tablet  Commonly known as: TORADOL  Take 1 tablet by mouth Every 6 (Six) Hours As Needed for Moderate Pain.     lidocaine 5 %  Commonly known as: LIDODERM  Place 1 patch on the skin as directed by provider Daily. Remove & Discard patch within 12 hours or as directed by MD     methocarbamol 750 MG tablet  Commonly known as: ROBAXIN  Take 1 tablet by mouth 3 (Three) Times a Day As Needed for Muscle Spasms.               Where to Get Your Medications        These medications were [place] [facility] [place] [address] - [phone]  - [phone] FX  [address], [place] [address]      Phone: [phone]   ketorolac 10 MG tablet  lidocaine 5 %  methocarbamol 750 MG  Shamika Sabillon, APRN  [address]  [place] [address]  [phone]             Final diagnoses:   Constipation, unspecified constipation type   Thoracic back pain, unspecified back pain laterality, unspecified chronicity   SOB (shortness of breath)        ED Disposition       ED Disposition   Discharge    Condition   Stable    Comment   --               This medical record created using voice recognition software.             Adelfo Salas PA-C  02/04/25 0258

## 2025-02-03 NOTE — Clinical Note
[facility] EMERGENCY ROOM  [address] [place] [address]  Phone: [phone]  Fax: [phone]    Abigail Jaimes was seen and treated in our emergency department on 2/3/2025.  She may return to work on 02/06/2025.         Thank you for choosing [facility].    Adelfo Salas PA-C

## 2025-02-03 NOTE — ED TRIAGE NOTES
Pt to ED for back pain that starts mid way up and goes up, pt states she isn't able to sit, stand or lay comfortable. Started this morning. Pt is also having abdominal pain. Denies n/v/d. States that she fells full, not had a normal bowel movement today. New vitamin B medication, Lower abdominal Pain.

## 2025-02-04 ENCOUNTER — APPOINTMENT (OUTPATIENT)
Dept: CT IMAGING | Facility: HOSPITAL | Age: 45
End: 2025-02-04
Payer: COMMERCIAL

## 2025-02-04 VITALS
BODY MASS INDEX: 43.4 KG/M2 | HEIGHT: 69 IN | TEMPERATURE: 97.7 F | WEIGHT: 293 LBS | OXYGEN SATURATION: 98 % | HEART RATE: 75 BPM | RESPIRATION RATE: 20 BRPM | DIASTOLIC BLOOD PRESSURE: 94 MMHG | SYSTOLIC BLOOD PRESSURE: 141 MMHG

## 2025-02-04 PROCEDURE — 25510000001 IOPAMIDOL PER 1 ML: Performed by: EMERGENCY MEDICINE

## 2025-02-04 PROCEDURE — 96376 TX/PRO/DX INJ SAME DRUG ADON: CPT

## 2025-02-04 PROCEDURE — 71275 CT ANGIOGRAPHY CHEST: CPT

## 2025-02-04 PROCEDURE — 25010000002 KETOROLAC TROMETHAMINE PER 15 MG

## 2025-02-04 RX ORDER — KETOROLAC TROMETHAMINE 30 MG/ML
30 INJECTION, SOLUTION INTRAMUSCULAR; INTRAVENOUS ONCE
Status: COMPLETED | OUTPATIENT
Start: 2025-02-04 | End: 2025-02-04

## 2025-02-04 RX ORDER — LIDOCAINE 50 MG/G
1 PATCH TOPICAL EVERY 24 HOURS
Qty: 10 EACH | Refills: 0 | Status: SHIPPED | OUTPATIENT
Start: 2025-02-04

## 2025-02-04 RX ORDER — KETOROLAC TROMETHAMINE 10 MG/1
10 TABLET, FILM COATED ORAL EVERY 6 HOURS PRN
Qty: 15 TABLET | Refills: 0 | Status: SHIPPED | OUTPATIENT
Start: 2025-02-04 | End: 2025-02-06

## 2025-02-04 RX ORDER — CYCLOBENZAPRINE HCL 10 MG
10 TABLET ORAL ONCE
Status: COMPLETED | OUTPATIENT
Start: 2025-02-04 | End: 2025-02-04

## 2025-02-04 RX ORDER — METHOCARBAMOL 750 MG/1
750 TABLET, FILM COATED ORAL 3 TIMES DAILY PRN
Qty: 21 TABLET | Refills: 0 | Status: SHIPPED | OUTPATIENT
Start: 2025-02-04 | End: 2025-02-06

## 2025-02-04 RX ORDER — IOPAMIDOL 755 MG/ML
100 INJECTION, SOLUTION INTRAVASCULAR
Status: COMPLETED | OUTPATIENT
Start: 2025-02-04 | End: 2025-02-04

## 2025-02-04 RX ADMIN — CYCLOBENZAPRINE HYDROCHLORIDE 10 MG: 10 TABLET, FILM COATED ORAL at 02:12

## 2025-02-04 RX ADMIN — Medication 10 ML: at 02:13

## 2025-02-04 RX ADMIN — KETOROLAC TROMETHAMINE 30 MG: 30 INJECTION, SOLUTION INTRAMUSCULAR; INTRAVENOUS at 02:12

## 2025-02-04 RX ADMIN — IOPAMIDOL 100 ML: 755 INJECTION, SOLUTION INTRAVENOUS at 01:50

## 2025-02-04 NOTE — DISCHARGE INSTRUCTIONS
Your COVID and flu swab are negative  Your lab work does not show any concerning abnormalities  Your x-ray of your abdomen shows that you are constipated.  You have been given magnesium citrate during your ED visit.  This has onset of 30 minutes to 6 hours.  CT of your chest did not show a large blood clot however it is possible you might possibly have a small one that will resolve.  Please follow-up with your PCP and have also put in referral for pulmonology.    Is monitor your blood pressure while taking Toradol.

## 2025-02-06 ENCOUNTER — LAB (OUTPATIENT)
Dept: LAB | Facility: HOSPITAL | Age: 45
End: 2025-02-06
Payer: COMMERCIAL

## 2025-02-06 ENCOUNTER — OFFICE VISIT (OUTPATIENT)
Dept: FAMILY MEDICINE CLINIC | Facility: CLINIC | Age: 45
End: 2025-02-06
Payer: COMMERCIAL

## 2025-02-06 ENCOUNTER — HOSPITAL ENCOUNTER (OUTPATIENT)
Dept: GENERAL RADIOLOGY | Facility: HOSPITAL | Age: 45
Discharge: HOME | End: 2025-02-06
Payer: COMMERCIAL

## 2025-02-06 VITALS
SYSTOLIC BLOOD PRESSURE: 125 MMHG | HEART RATE: 86 BPM | WEIGHT: 293 LBS | HEIGHT: 69 IN | BODY MASS INDEX: 43.4 KG/M2 | DIASTOLIC BLOOD PRESSURE: 82 MMHG | OXYGEN SATURATION: 98 %

## 2025-02-06 DIAGNOSIS — R79.89 POSITIVE D DIMER: Primary | ICD-10-CM

## 2025-02-06 DIAGNOSIS — M54.6 THORACIC BACK PAIN, UNSPECIFIED BACK PAIN LATERALITY, UNSPECIFIED CHRONICITY: ICD-10-CM

## 2025-02-06 DIAGNOSIS — R93.89 ABNORMAL CT OF THE CHEST: ICD-10-CM

## 2025-02-06 DIAGNOSIS — D72.829 LEUKOCYTOSIS, UNSPECIFIED TYPE: ICD-10-CM

## 2025-02-06 DIAGNOSIS — R91.1 LUNG NODULE: ICD-10-CM

## 2025-02-06 DIAGNOSIS — M54.6 THORACIC BACK PAIN, UNSPECIFIED BACK PAIN LATERALITY, UNSPECIFIED CHRONICITY: Primary | ICD-10-CM

## 2025-02-06 DIAGNOSIS — E87.6 HYPOKALEMIA: ICD-10-CM

## 2025-02-06 LAB
ANION GAP SERPL CALCULATED.3IONS-SCNC: 7.2 MMOL/L (ref 5–15)
BASOPHILS # BLD AUTO: 0.02 10*3/MM3 (ref 0–0.2)
BASOPHILS NFR BLD AUTO: 0.3 % (ref 0–1.5)
BUN SERPL-MCNC: 21 MG/DL (ref 6–20)
BUN/CREAT SERPL: 21.9 (ref 7–25)
CALCIUM SPEC-SCNC: 9.3 MG/DL (ref 8.6–10.5)
CHLORIDE SERPL-SCNC: 104 MMOL/L (ref 98–107)
CO2 SERPL-SCNC: 28.8 MMOL/L (ref 22–29)
CREAT SERPL-MCNC: 0.96 MG/DL (ref 0.57–1)
D DIMER PPP FEU-MCNC: 0.73 MCGFEU/ML (ref 0–0.5)
DEPRECATED RDW RBC AUTO: 40.1 FL (ref 37–54)
EGFRCR SERPLBLD CKD-EPI 2021: 75.4 ML/MIN/1.73
EOSINOPHIL # BLD AUTO: 0.22 10*3/MM3 (ref 0–0.4)
EOSINOPHIL NFR BLD AUTO: 3.5 % (ref 0.3–6.2)
ERYTHROCYTE [DISTWIDTH] IN BLOOD BY AUTOMATED COUNT: 12.9 % (ref 12.3–15.4)
GLUCOSE SERPL-MCNC: 101 MG/DL (ref 65–99)
HCT VFR BLD AUTO: 40.5 % (ref 34–46.6)
HGB BLD-MCNC: 12.6 G/DL (ref 12–15.9)
IMM GRANULOCYTES # BLD AUTO: 0.02 10*3/MM3 (ref 0–0.05)
IMM GRANULOCYTES NFR BLD AUTO: 0.3 % (ref 0–0.5)
LYMPHOCYTES # BLD AUTO: 1.46 10*3/MM3 (ref 0.7–3.1)
LYMPHOCYTES NFR BLD AUTO: 23.2 % (ref 19.6–45.3)
MCH RBC QN AUTO: 26.5 PG (ref 26.6–33)
MCHC RBC AUTO-ENTMCNC: 31.1 G/DL (ref 31.5–35.7)
MCV RBC AUTO: 85.1 FL (ref 79–97)
MONOCYTES # BLD AUTO: 0.51 10*3/MM3 (ref 0.1–0.9)
MONOCYTES NFR BLD AUTO: 8.1 % (ref 5–12)
NEUTROPHILS NFR BLD AUTO: 4.07 10*3/MM3 (ref 1.7–7)
NEUTROPHILS NFR BLD AUTO: 64.6 % (ref 42.7–76)
NRBC BLD AUTO-RTO: 0 /100 WBC (ref 0–0.2)
PLATELET # BLD AUTO: 276 10*3/MM3 (ref 140–450)
PMV BLD AUTO: 12.5 FL (ref 6–12)
POTASSIUM SERPL-SCNC: 4.2 MMOL/L (ref 3.5–5.2)
RBC # BLD AUTO: 4.76 10*6/MM3 (ref 3.77–5.28)
SODIUM SERPL-SCNC: 140 MMOL/L (ref 136–145)
WBC NRBC COR # BLD AUTO: 6.3 10*3/MM3 (ref 3.4–10.8)

## 2025-02-06 PROCEDURE — 85025 COMPLETE CBC W/AUTO DIFF WBC: CPT

## 2025-02-06 PROCEDURE — 72072 X-RAY EXAM THORAC SPINE 3VWS: CPT

## 2025-02-06 PROCEDURE — 36415 COLL VENOUS BLD VENIPUNCTURE: CPT

## 2025-02-06 PROCEDURE — 99214 OFFICE O/P EST MOD 30 MIN: CPT | Performed by: NURSE PRACTITIONER

## 2025-02-06 PROCEDURE — 80048 BASIC METABOLIC PNL TOTAL CA: CPT

## 2025-02-06 PROCEDURE — 85379 FIBRIN DEGRADATION QUANT: CPT

## 2025-02-06 RX ORDER — DICLOFENAC SODIUM 75 MG/1
75 TABLET, DELAYED RELEASE ORAL 2 TIMES DAILY
Qty: 30 TABLET | Refills: 0 | Status: SHIPPED | OUTPATIENT
Start: 2025-02-06

## 2025-02-06 RX ORDER — CYCLOBENZAPRINE HCL 10 MG
10 TABLET ORAL 3 TIMES DAILY PRN
Qty: 21 TABLET | Refills: 0 | Status: SHIPPED | OUTPATIENT
Start: 2025-02-06

## 2025-02-06 NOTE — PROGRESS NOTES
Chief Complaint  Abdominal Pain and ER Follow Up    SUBJECTIVE  Abigail Jaimes presents to [facility] to follow up from recent ER visit for abdominal pain. Pt reports she is no longer having the stomach pain but is till having upper pain back pain. Constipation has improved.     Started with 4 days of constipation, got some miralax, but started having back pain that continued to get worse.  States no known injury, has not been doing any more than usual activity.  Patient's back pain and constipation got to the point that she went to the ER.    ER ordered CT chest for PE and could not rule out a possible small PE    Pt went on cruise Jan 17th-21st, flight was 2&1/2 hours. Pt has no personal hx of DVT/PE.     ER notes that patient was complaining of shortness of breath and pain with inspiration, pt states that she has no SOB, states that she never had any SOB at all, she doesn't know why it is documented that she did. Has not had any cough, has not had any chest pain.  She still denies shortness of breath at this time as well    States that her back pain has not improved at all.  Is taking the methocarbamol and ketorolac as directed but it has not helped, was unable to  the Lidoderm patches as insurance did not cover them    Patient reports that the pain seems to get worse when she lays back for a while      History of Present Illness  Past Medical History:   Diagnosis Date    Anxiety     Depression     Hypertension     Varicose vein of leg       Family History   Problem Relation Age of Onset    No Known Problems Mother     No Known Problems Father     No Known Problems Sister     No Known Problems Brother     No Known Problems Son     No Known Problems Daughter     No Known Problems Maternal Grandmother     No Known Problems Maternal Grandfather     No Known Problems Paternal Grandmother     No Known Problems Paternal Grandfather     No Known Problems Cousin     No Known  "Problems Other     Rheum arthritis Neg Hx     Osteoarthritis Neg Hx     Asthma Neg Hx     Diabetes Neg Hx     Heart failure Neg Hx     Hyperlipidemia Neg Hx     Hypertension Neg Hx     Migraines Neg Hx     Rashes / Skin problems Neg Hx     Seizures Neg Hx     Stroke Neg Hx     Thyroid disease Neg Hx       Past Surgical History:   Procedure Laterality Date    DENTAL PROCEDURE      HEMORRHOIDECTOMY      HYSTERECTOMY  2013    HYSTERECTOMY      STOMACH SURGERY          Current Outpatient Medications:     Cholecalciferol (vitamin D3) 125 MCG (5000 UT) tablet tablet, Take 1 tablet by mouth Daily., Disp: 30 tablet, Rfl: 1    hydroCHLOROthiazide 25 MG tablet, Take 1 tablet by mouth Daily., Disp: 30 tablet, Rfl: 1    lisinopril (PRINIVIL,ZESTRIL) 40 MG tablet, Take 1 tablet by mouth Daily., Disp: 30 tablet, Rfl: 1    loratadine (Claritin) 10 MG tablet, Take 1 tablet by mouth Daily., Disp: 30 tablet, Rfl: 1    cyclobenzaprine (FLEXERIL) 10 MG tablet, Take 1 tablet by mouth 3 (Three) Times a Day As Needed for Muscle Spasms., Disp: 21 tablet, Rfl: 0    diclofenac (VOLTAREN) 75 MG EC tablet, Take 1 tablet by mouth 2 (Two) Times a Day., Disp: 30 tablet, Rfl: 0    lidocaine (LIDODERM) 5 %, Place 1 patch on the skin as directed by provider Daily. Remove & Discard patch within 12 hours or as directed by MD (Patient not taking: Reported on 2/6/2025), Disp: 10 each, Rfl: 0    OBJECTIVE  Vital Signs:   /82   Pulse 86   Ht 175.3 cm (69\")   Wt (!) 140 kg (308 lb)   SpO2 98%   BMI 45.48 kg/m²    Estimated body mass index is 45.48 kg/m² as calculated from the following:    Height as of this encounter: 175.3 cm (69\").    Weight as of this encounter: 140 kg (308 lb).     Wt Readings from Last 3 Encounters:   02/06/25 (!) 140 kg (308 lb)   02/03/25 (!) 140 kg (309 lb 1.4 oz)   11/27/24 (!) 137 kg (302 lb)     BP Readings from Last 3 Encounters:   02/06/25 125/82   02/04/25 141/94   11/27/24 116/62       Physical Exam  Vitals " reviewed.   Constitutional:       Appearance: Normal appearance. She is well-developed.   HENT:      Head: Normocephalic and atraumatic.      Right Ear: External ear normal.      Left Ear: External ear normal.   Eyes:      Conjunctiva/sclera: Conjunctivae normal.      Pupils: Pupils are equal, round, and reactive to light.   Cardiovascular:      Rate and Rhythm: Normal rate and regular rhythm.      Heart sounds: No murmur heard.     No friction rub. No gallop.   Pulmonary:      Effort: Pulmonary effort is normal.      Breath sounds: Normal breath sounds. No wheezing or rhonchi.   Musculoskeletal:        Back:       Comments: Patient reporting pain in thoracic area with a couple spots of point tenderness, pain does not seem to worsen with range of motion.  No spinal tenderness   Skin:     General: Skin is warm and dry.   Neurological:      Mental Status: She is alert and oriented to person, place, and time.      Cranial Nerves: No cranial nerve deficit.   Psychiatric:         Mood and Affect: Mood and affect normal.         Behavior: Behavior normal.         Thought Content: Thought content normal.         Judgment: Judgment normal.          Result Review    CMP          2/3/2025    18:16   CMP   Glucose 126    BUN 18    Creatinine 1.10    EGFR 64.1    Sodium 138    Potassium 3.3    Chloride 100    Calcium 9.6    Total Protein 7.8    Albumin 4.1    Globulin 3.7    Total Bilirubin 0.6    Alkaline Phosphatase 139    AST (SGOT) 23    ALT (SGPT) 25    Albumin/Globulin Ratio 1.1    BUN/Creatinine Ratio 16.4    Anion Gap 10.7      CBC          2/3/2025    18:16   CBC   WBC 11.06    RBC 4.97    Hemoglobin 13.2    Hematocrit 42.1    MCV 84.7    MCH 26.6    MCHC 31.4    RDW 13.8    Platelets 275            CT Angiogram Chest Pulmonary Embolism    Result Date: 2/4/2025  1. Suboptimal bolus timing complicated by body habitus. No central pulmonary emboli. Small right upper lobe pulmonary emboli cannot be completely excluded.  Consider repeat exam if renal function permits or VQ scan if indicated. 2. No aortic aneurysm or dissection. 3. No acute pulmonary infiltrates. 4. 2 tiny right lower lobe pulmonary nodules. Indeterminate solid pulmonary nodule measuring 4 mm. In a low-risk patient with a solid nodule <6 mm, recommend no follow-up. In a high-risk patient, CT at 12 months is optional with stronger consideration if there is suspicious nodule morphology and/or upper lobe location. NOTIFICATION: Critical Value/emergent results were called by telephone at the time of interpretation on 2/4/2025 2:29 AM EST to HEBERT MONTAGUE who verbally acknowledged these results. Electronically Signed: Marty Abbott MD  2/4/2025 2:29 AM EST  Workstation ID: [id number]    XR Abdomen KUB    Result Date: 2/3/2025  Impression: No evidence of bowel obstruction. Moderate colonic stool burden which can be seen with constipation. Electronically Signed: Julien Garcia MD  2/3/2025 7:58 PM EST  Workstation ID: [id number]    Mammo Diagnostic Digital Tomosynthesis Left With CAD    Result Date: 12/20/2024   IMPRESSION: Diagnostic left mammogram and targeted left breast ultrasound demonstrating no abnormality.  I met with the ultrasound technologist and patient and discussed findings and recommendations. The patient is concerned that she feels a mass. Surgical consultation is recommended.  TISSUE DENSITY: The breasts are almost entirely fatty.  BI-RADS ASSESSMENT: BI-RADS 2. Benign findings.   Note: It has been reported that there is approximately a 15% false negative in mammography. Therefore, management of a palpable abnormality should not be deferred because of a negative mammogram.    Electronically Signed By-FERNANDA SETHI MD On:12/20/2024 1:34 PM      US Breast Left Limited    Result Date: 12/20/2024   IMPRESSION: Diagnostic left mammogram and targeted left breast ultrasound demonstrating no abnormality.  I met with the ultrasound technologist and patient and  discussed findings and recommendations. The patient is concerned that she feels a mass. Surgical consultation is recommended.  TISSUE DENSITY: The breasts are almost entirely fatty.  BI-RADS ASSESSMENT: BI-RADS 2. Benign findings.   Note: It has been reported that there is approximately a 15% false negative in mammography. Therefore, management of a palpable abnormality should not be deferred because of a negative mammogram.    Electronically Signed By-FERNANDA SETHI MD On:12/20/2024 1:34 PM         The above data has been reviewed by ELISEO Miguel 02/06/2025 08:01 EST.          Patient Care Team:  Shamika Mcdonald APRN as PCP - General (Nurse Practitioner)  [facility] (Pharmacy)            ASSESSMENT & PLAN    Diagnoses and all orders for this visit:    1. Thoracic back pain, unspecified back pain laterality, unspecified chronicity (Primary)  -     CBC w AUTO Differential; Future  -     Basic metabolic panel; Future  -     D-dimer, Quantitative; Future  -     XR Spine Thoracic 3 View; Future  -     cyclobenzaprine (FLEXERIL) 10 MG tablet; Take 1 tablet by mouth 3 (Three) Times a Day As Needed for Muscle Spasms.  Dispense: 21 tablet; Refill: 0  -     diclofenac (VOLTAREN) 75 MG EC tablet; Take 1 tablet by mouth 2 (Two) Times a Day.  Dispense: 30 tablet; Refill: 0    2. Abnormal CT of the chest  -     D-dimer, Quantitative; Future    3. Leukocytosis, unspecified type  -     CBC w AUTO Differential; Future    4. Hypokalemia  Comments:  Slight hypokalemia noted, we will recheck  Orders:  -     Basic metabolic panel; Future    5. Lung nodule  Comments:  We will repeat CT in 1 year    Discussed with patient that given the areas of tenderness in her back I am inclined to believe that this is musculoskeletal in nature, recommend trialing a heating pad, we will trial a different muscle relaxer and anti-inflammatory and I have instructed the patient to buy over-the-counter lidocaine patches I reviewed CT  "scan, \"Small right upper lobe pulmonary emboli cannot be completely excluded.\"  Discussed  patient not really describing symptoms typical of PE, however we will order D-dimer for further evaluation.  Discussed that given that she is a smoker I do recommend that we repeat the CT of her chest in 1 year to follow-up on the nodule.  Discussed if D-dimer is elevated we will need further evaluation.  If any new or worsening symptoms or development of shortness of breath seek emergent reevaluation    Tobacco Use: Medium Risk (2/6/2025)    Patient History     Smoking Tobacco Use: Former     Smokeless Tobacco Use: Never     Passive Exposure: Past       Follow Up     Return in about 2 weeks (around 2/20/2025), or if symptoms worsen or fail to improve.        Patient was given instructions and counseling regarding her condition or for health maintenance advice. Please see specific information pulled into the AVS if appropriate.   I have reviewed information obtained and documented by others and I have confirmed the accuracy of this documented note.    Shamika Mcdonald, ELISEO        "

## 2025-02-07 ENCOUNTER — HOSPITAL ENCOUNTER (OUTPATIENT)
Dept: NUCLEAR MEDICINE | Facility: HOSPITAL | Age: 45
Discharge: HOME | End: 2025-02-07
Payer: COMMERCIAL

## 2025-02-07 ENCOUNTER — TELEPHONE (OUTPATIENT)
Dept: FAMILY MEDICINE CLINIC | Facility: CLINIC | Age: 45
End: 2025-02-07
Payer: COMMERCIAL

## 2025-02-07 ENCOUNTER — HOSPITAL ENCOUNTER (OUTPATIENT)
Dept: GENERAL RADIOLOGY | Facility: HOSPITAL | Age: 45
Discharge: HOME | End: 2025-02-07
Admitting: NURSE PRACTITIONER
Payer: COMMERCIAL

## 2025-02-07 DIAGNOSIS — R79.89 POSITIVE D DIMER: ICD-10-CM

## 2025-02-07 DIAGNOSIS — R93.89 ABNORMAL CT OF THE CHEST: ICD-10-CM

## 2025-02-07 PROCEDURE — A9540 TC99M MAA: HCPCS | Performed by: NURSE PRACTITIONER

## 2025-02-07 PROCEDURE — 78582 LUNG VENTILAT&PERFUS IMAGING: CPT

## 2025-02-07 PROCEDURE — 34310000005 TECHNETIUM ALBUMIN AGGREGATED: Performed by: NURSE PRACTITIONER

## 2025-02-07 PROCEDURE — 71046 X-RAY EXAM CHEST 2 VIEWS: CPT

## 2025-02-07 RX ADMIN — KIT FOR THE PREPARATION OF TECHNETIUM TC 99M ALBUMIN AGGREGATED 1 DOSE: 2.5 INJECTION, POWDER, FOR SOLUTION INTRAVENOUS at 13:35

## 2025-02-07 NOTE — TELEPHONE ENCOUNTER
Caller: Abigail Jaimes    Relationship: Self    Best call back number:   Telephone Information:   Mobile [phone]         What medication are you requesting: TYLENOL 3    What are your current symptoms: BACK PAIN      If a prescription is needed, what is your preferred pharmacy and phone number: [facility] - [place] - [place] - [address] [facility] [address] - [phone] [facility] [phone] FX     Additional notes:    THE PATIENT SAID SHE ALREADY HAVE THE IBUPROFEN  AND IS WANTING TO KNOW IF PCP CALVIN WOULD PRESCRIBE TYLENOL 3 SO SHE CAN ALTERNATE BETWEEN THE TWO. SHE SAID THE MUSCLE RELAXER IS  NOT HELPING HER. SHE SAID SHE IS NOT WANTING TO TAKE THEM IF THEY WILL NOT WORK        THE PATIENT IS REQUESTING A CALL FOR THE DECISION

## 2025-02-07 NOTE — TELEPHONE ENCOUNTER
Caller: Abigail Jaimes    Relationship: Self    Best call back number:   Telephone Information:   Mobile [phone]         What is the best time to reach you: ANYTIME     Who are you requesting to speak with (clinical staff, provider,  specific staff member): CLINICAL        What was the call regarding:       THE PATIENT SAID SHE IS REALLY CONCERNED ABOUT HER  CONDITIONS AND IS WANTING TO KNOW IF SHE SHOULD TAKE BABY ASPIRIN OR BLOOD THINNERS.

## 2025-02-07 NOTE — PROGRESS NOTES
Per radiology needs a chest xray before they can complete [facility] Lung Ventilation Perfusion     Order placed to be co signed.

## 2025-02-09 NOTE — PROGRESS NOTES
Primary Care Provider  Shamika Mcdonald APRN   Referring Provider  Adelfo Salas PA-C    Patient Complaint  New patient appointment to establish care and lung nodules on CT     Patient or patient representative verbalized consent for the use of Ambient Listening during the visit with  ELISEO Sullivan for chart documentation. 2/10/2025  16:05 EST      Subjective       History of Presenting Illness  Abigail Jaimes is a pleasant 44 y.o. female who presents to [facility] here for new patient appointment. Patient was seen in the ED 2/3/2025 for constipation. She had a CT angiogram of chest and findings revealed 2 right lower lobe nodules about 4 mm in size.       History of Present Illness  The patient is a 44-year-old female who presents to establish care. She is accompanied by her .    She was referred to our facility following an emergency room visit at [facility] on 02/03/2025 for constipation, during which pulmonary nodules were incidentally discovered. She reports no renal issues and is not currently experiencing any shortness of breath or gasping for air. She has been informed that she does not have pleural effusion. She does not possess any oxygen or durable medical equipment at home, including CPAP, BiPAP, ventilators, or chest vests. She is not currently on any pulmonary medications. She has a history of smoking but has transitioned to vaping with nicotine. She has a history of nasal fracture and takes allergy medication daily due to persistent nasal congestion. She does not feel ill despite the congestion.    She reports severe pain in her right upper back, which she does believe to be muscular in origin. The pain began abruptly on Monday morning, initially localized to the lower middle part of her back before radiating upwards. She reports no associated injury. She was prescribed Robaxin and another medication by the hospital, but  these have not provided relief. She does not wish to continue medications that are ineffective. She has found some relief with muscle relaxers and suspects the pain may be due to a deep muscle issue.    She has been diagnosed with prediabetes by her primary care physician.    She is on a daily regimen of lisinopril and hydrochlorothiazide for blood pressure management and fluid retention, respectively.    Supplemental Information  She is not pregnant. She also takes loratadine and vitamin D.    SOCIAL HISTORY  The patient is vaping but not smoking cigarettes. She uses nicotine with vaping.    MEDICATIONS  Current: lisinopril, hydrochlorothiazide, loratadine, vitamin D, diclofenac    IMMUNIZATIONS  The patient does not do vaccines at this time.       At present time patient denies dyspnea, coughing, wheezing, headaches, chest pain, weight loss or hemoptysis. Patient denies fevers, chills and night sweats. Abigail Jaimes is able to perform ADLs.      I have personally reviewed the review of systems, past family, social, medical and surgical histories; and agree with their findings.      Review of Systems   Constitutional: Negative.    HENT: Negative.     Respiratory: Negative.     Cardiovascular: Negative.    Musculoskeletal:  Positive for back pain.   Neurological: Negative.    Psychiatric/Behavioral: Negative.           Family History   Problem Relation Age of Onset    Diabetes Mother     Cancer Father     COPD Sister     COPD Brother     COPD Maternal Grandmother     No Known Problems Maternal Grandfather     No Known Problems Paternal Grandmother     No Known Problems Paternal Grandfather     No Known Problems Daughter     No Known Problems Son     No Known Problems Cousin     No Known Problems Other     Rheum arthritis Neg Hx     Osteoarthritis Neg Hx     Asthma Neg Hx     Heart failure Neg Hx     Hyperlipidemia Neg Hx     Hypertension Neg Hx     Migraines Neg Hx     Rashes / Skin problems Neg Hx     Seizures Neg  Hx     Stroke Neg Hx     Thyroid disease Neg Hx         Social History     Socioeconomic History    Marital status:    Tobacco Use    Smoking status: Former     Current packs/day: 0.00     Average packs/day: 1 pack/day for 24.0 years (24.0 ttl pk-yrs)     Types: Cigarettes     Start date: 8/9/1997     Quit date: 8/16/2021     Years since quitting: 3.4     Passive exposure: Past    Smokeless tobacco: Never   Vaping Use    Vaping status: Every Day    Substances: Nicotine   Substance and Sexual Activity    Alcohol use: Not Currently     Alcohol/week: 7.0 standard drinks of alcohol     Types: 7 Shots of liquor per week     Comment: IN RECOVERY    Drug use: Not Currently     Frequency: 7.0 times per week     Types: Fentanyl, Heroin, Hydrocodone, Methamphetamines, Oxycodone     Comment: IN RECOVERY    Sexual activity: Yes     Partners: Male     Birth control/protection: None        Past Medical History:   Diagnosis Date    Anxiety     Depression     Hypertension     Varicose vein of leg         Immunization History   Administered Date(s) Administered    Tdap 10/09/2017       No Known Allergies       Current Outpatient Medications:     Cholecalciferol (vitamin D3) 125 MCG (5000 UT) tablet tablet, Take 1 tablet by mouth Daily., Disp: 30 tablet, Rfl: 1    cyclobenzaprine (FLEXERIL) 10 MG tablet, Take 1 tablet by mouth 3 (Three) Times a Day As Needed for Muscle Spasms., Disp: 21 tablet, Rfl: 0    diclofenac (VOLTAREN) 75 MG EC tablet, Take 1 tablet by mouth 2 (Two) Times a Day., Disp: 30 tablet, Rfl: 0    hydroCHLOROthiazide 25 MG tablet, Take 1 tablet by mouth Daily., Disp: 30 tablet, Rfl: 1    ibuprofen (ADVIL,MOTRIN) 800 MG tablet, Take 1 tablet by mouth Every 6 (Six) Hours As Needed for Moderate Pain., Disp: 30 tablet, Rfl: 0    lidocaine (LIDODERM) 5 %, Place 1 patch on the skin as directed by provider Daily. Remove & Discard patch within 12 hours or as directed by MD, Disp: 10 each, Rfl: 0    lisinopril  "(PRINIVIL,ZESTRIL) 40 MG tablet, Take 1 tablet by mouth Daily., Disp: 30 tablet, Rfl: 1    loratadine (Claritin) 10 MG tablet, Take 1 tablet by mouth Daily., Disp: 30 tablet, Rfl: 1         Vital Signs   /83 (BP Location: Right arm, Patient Position: Sitting, Cuff Size: Adult)   Pulse 97   Temp 96.7 °F (35.9 °C)   Resp 16   Ht 175.3 cm (69\")   Wt (!) 140 kg (308 lb)   SpO2 98%   BMI 45.48 kg/m²       Objective     Physical Exam  Vitals reviewed.   Constitutional:       General: She is not in acute distress.     Appearance: Normal appearance. She is obese. She is not ill-appearing.   HENT:      Head: Normocephalic and atraumatic.      Nose: Nose normal.      Mouth/Throat:      Mouth: Mucous membranes are moist.      Pharynx: Oropharynx is clear.   Eyes:      Extraocular Movements: Extraocular movements intact.      Conjunctiva/sclera: Conjunctivae normal.      Pupils: Pupils are equal, round, and reactive to light.   Cardiovascular:      Rate and Rhythm: Normal rate and regular rhythm.      Pulses: Normal pulses.      Heart sounds: Normal heart sounds.   Pulmonary:      Effort: Pulmonary effort is normal. No respiratory distress.      Breath sounds: Normal breath sounds. No stridor. No wheezing, rhonchi or rales.   Abdominal:      General: Bowel sounds are normal.   Musculoskeletal:         General: Tenderness present. Normal range of motion.        Arms:       Cervical back: Normal range of motion and neck supple.   Skin:     General: Skin is warm and dry.   Neurological:      Mental Status: She is alert and oriented to person, place, and time.   Psychiatric:         Behavior: Behavior normal.         Physical Exam  Lungs sound clear.  Heart rhythm is regular.    Vital Signs  Blood pressure is normal. Oxygen saturation is normal.         Results Review  I have personally reviewed the prior office notes, hospital records, labs, and diagnostics.  XR Chest 2 View [[id number]] (Order [id number])  Order  Status: " Final result     Study Notes     Marilee Huertas, RT on 2/7/2025 11:22 AM EST   MID BACK TO BILATERAL RIB PAIN X SEVERAL DAYS,NKI. EVALUATE FOR PE  SHIELDED/DENIED PREG     Appointment Information    PACS Images     Radiology Images  Study Result    Narrative & Impression   XR CHEST 2 VW     Date of Exam: 2/7/2025 11:15 AM EST     Indication: rule pe     Comparison: None available.     Findings:  The lungs appear adequately aerated without consolidation or mass. No pleural effusion or pneumothorax is identified. The cardiomediastinal silhouette and pulmonary vasculature appear within normal limits. No acute or suspicious osseous lesion is   identified.      IMPRESSION:  Impression:  1.No acute radiographic abnormality is identified.        Electronically Signed: Ruddy Hurd MD    2/7/2025 4:36 PM EST    Workstation ID: [id number]   CT Angiogram Chest Pulmonary Embolism [[id number]] (Order [id number])  Order  Status: Final result     Study Notes     Jerry Manzo on 2/4/2025  1:52 AM EST   Tech-Jerry Jovany  89 mL  Isovue 370    PT C/O UPPER BACK PAIN X 2 DAYS..  NO SOA.  NO SX OR CANCER HX.       Appointment Information    PACS Images     Radiology Images  Study Result    Narrative & Impression   CT ANGIOGRAM CHEST PULMONARY EMBOLISM     Date of Exam: 2/4/2025 1:33 AM EST     Indication: sob/recent travel. Upper back pain.     Comparison: None available.     Technique: Axial CT images were obtained of the chest after the uneventful intravenous administration of iodinated contrast utilizing pulmonary embolism protocol.  Reconstructed coronal and sagittal images were also obtained. Automated exposure control   and iterative construction methods were used.     Findings:  There is some artifact due to body habitus. Bolus timing is suboptimal as well. No central pulmonary emboli are identified. The subsegmental and more distal vessels are fairly poorly characterized on both sides of the chest. Small emboli in the right    upper lobe cannot be completely excluded. If renal function permits, consider repeat exam or consider VQ scan. No aortic aneurysm or aortic dissection. No pleural or pericardial effusion. No adenopathy. No acute findings in the visualized upper abdomen.     There are 2 adjacent subpleural nodules in the right lower lobe both measuring about 4 mm in size. The lungs are otherwise clear. No acute infiltrates. No pulmonary edema. No pneumothorax. No fracture or malalignment in the thoracic spine.     IMPRESSION:     1. Suboptimal bolus timing complicated by body habitus. No central pulmonary emboli. Small right upper lobe pulmonary emboli cannot be completely excluded. Consider repeat exam if renal function permits or VQ scan if indicated.  2. No aortic aneurysm or dissection.  3. No acute pulmonary infiltrates.  4. 2 tiny right lower lobe pulmonary nodules.     Indeterminate solid pulmonary nodule measuring 4 mm. In a low-risk patient with a solid nodule <6 mm, recommend no follow-up. In a high-risk patient, CT at 12 months is optional with stronger consideration if there is suspicious nodule morphology and/or   upper lobe location.     NOTIFICATION: Critical Value/emergent results were called by telephone at the time of interpretation on 2/4/2025 2:29 AM EST to HEBERT MONTAGUE who verbally acknowledged these results.                       Electronically Signed: Marty Abbott MD    2/4/2025 2:29 AM EST    Workstation ID: [id number]   NM Lung Ventilation Perfusion [[id number]] (Order [id number])  Order  Status: Final result     Study Notes     Paula Moreno on 2/7/2025 12:29 PM EST   CALL STAT RESULTS TO KEYA SIMPSON [phone]     Appointment Information    Display Notes    EKTA POSEY. CALL STAT RESULTS TO KEYA SIMPSON [phone]                  PACS Images     Radiology Images      Study Result    Narrative & Impression   DATE OF EXAM: 2/7/2025 1:07 PM EST     PROCEDURE: NM LUNG VENTILATION  PERFUSION     INDICATIONS: Elevated D-dimer, abnormal CT chest     COMPARISON: Chest x-ray 2/4/2025 and CT pulmonary angiogram 2/4/2025     TECHNIQUE: The patient was ventilated with 32.6 mCi of technetium 99m pertechnetate aerosol and ventilation images were acquired in multiple obliquities. The patient then received 4.2 mCi of technetium 99m MAA intravenously and perfusion images were   acquired in multiple obliquities.     FINDINGS:  There is homogeneous pulmonary perfusion without moderate or large segmental or subsegmental filling defects noted. Ventilation images demonstrate heterogeneous radiotracer with some minimal clumping in the upper airways bilaterally considered a   nonspecific finding.     IMPRESSION:  Homogeneous perfusion imaging with low probability for pulmonary embolus.        Electronically Signed: Jesusita Herzog MD    2/7/2025 3:01 PM EST    Workstation ID: [id number]       Results  Laboratory Studies  Creatinine was slightly elevated.    Imaging  CT angiogram showed no central pulmonary emboli, small right upper lobe pulmonary emboli could not be excluded, and two tiny small right lower lobe nodules. Chest x-ray showed no infection, fluid, or acute conditions like pneumonia.    Assessment         Patient Active Problem List   Diagnosis    Varicose veins of bilateral lower extremities with other complications    Hypertension    Type 2 diabetes mellitus without complication    Class 3 severe obesity with serious comorbidity and body mass index (BMI) of 40.0 to 44.9 in adult    Other chronic pain        Plan     Diagnoses and all orders for this visit:    1. Lung nodules (Primary)  -     CT Chest Without Contrast; Future  -     Cancel: CT Chest With Contrast; Future    2. Nicotine dependence, cigarettes, in remission  -     CT Chest Without Contrast; Future    3. Abnormal CT scan of lung  -     CT Chest Without Contrast; Future  -     Cancel: CT Chest With Contrast; Future      Assessment &  Plan  1. Pulmonary nodules.  The patient was referred to our facility following an emergency room visit at [facility] on 02/03/2025 for constipation, during which pulmonary nodules were incidentally discovered. The CT scan revealed two small nodules in the right lower lobe of the lung, each measuring 4 mm. Given her history of smoking, it is prudent to monitor these nodules closely. The chest x-ray did not indicate any lung infection, fluid accumulation, or acute conditions such as pneumonia. The pain she is experiencing is likely musculoskeletal rather than pulmonary in origin. A follow-up CT scan without contrast will be scheduled in 6 months to monitor the status of the nodules. NM Lung Ventilation Perfusion with findings of homogeneous perfusion imaging with low probability for pulmonary embolus.     2. Musculoskeletal pain.  The pain in her upper back is likely musculoskeletal in nature. She is advised to follow up with her family doctor for further evaluation and management. Physical therapy may be considered if the pain persists.    3. Prediabetes.  She mentioned being told by her family doctor that she is prediabetic.        Smoking status:  reports that she quit smoking about 3 years ago. Her smoking use included cigarettes. She started smoking about 27 years ago. She has a 24 pack-year smoking history. She has been exposed to tobacco smoke. She has never used smokeless tobacco.    Vaccination status: Reviewed  Immunization History   Administered Date(s) Administered    Tdap 10/09/2017        Medications personally reviewed    Follow Up  Return in about 6 months (around 8/10/2025) for after CT scan.    Patient was given instructions and counseling regarding her condition or for health maintenance advice. Please see specific information pulled into the AVS if appropriate.     I spent 15 minutes caring for Abigail Jaimes on this date of service. This time includes time spent by me in the following  activities:preparing for the visit, reviewing tests, obtaining and/or reviewing a separately obtained history, performing a medically appropriate examination and/or evaluation, counseling and educating the patient/family/caregiver, ordering medications, tests, or procedures, documenting information in the medical record, independently interpreting results and communicating that information with the patient/family/caregiver and answered questions family members, discuss medications.

## 2025-02-10 ENCOUNTER — OFFICE VISIT (OUTPATIENT)
Dept: PULMONOLOGY | Facility: CLINIC | Age: 45
End: 2025-02-10
Payer: COMMERCIAL

## 2025-02-10 ENCOUNTER — TELEPHONE (OUTPATIENT)
Dept: FAMILY MEDICINE CLINIC | Facility: CLINIC | Age: 45
End: 2025-02-10
Payer: COMMERCIAL

## 2025-02-10 VITALS
BODY MASS INDEX: 43.4 KG/M2 | HEART RATE: 97 BPM | OXYGEN SATURATION: 98 % | TEMPERATURE: 96.7 F | RESPIRATION RATE: 16 BRPM | DIASTOLIC BLOOD PRESSURE: 83 MMHG | HEIGHT: 69 IN | WEIGHT: 293 LBS | SYSTOLIC BLOOD PRESSURE: 131 MMHG

## 2025-02-10 DIAGNOSIS — F17.211 NICOTINE DEPENDENCE, CIGARETTES, IN REMISSION: ICD-10-CM

## 2025-02-10 DIAGNOSIS — R91.8 ABNORMAL CT SCAN OF LUNG: ICD-10-CM

## 2025-02-10 DIAGNOSIS — R91.8 LUNG NODULES: Primary | ICD-10-CM

## 2025-02-10 DIAGNOSIS — M54.6 THORACIC BACK PAIN, UNSPECIFIED BACK PAIN LATERALITY, UNSPECIFIED CHRONICITY: Primary | ICD-10-CM

## 2025-02-10 RX ORDER — IBUPROFEN 800 MG/1
800 TABLET, FILM COATED ORAL EVERY 6 HOURS PRN
Qty: 30 TABLET | Refills: 0 | Status: SHIPPED | OUTPATIENT
Start: 2025-02-10

## 2025-03-12 RX ORDER — LORATADINE 10 MG/1
10 TABLET ORAL DAILY
Qty: 30 TABLET | Refills: 1 | Status: SHIPPED | OUTPATIENT
Start: 2025-03-12

## 2025-03-12 RX ORDER — IBUPROFEN 800 MG/1
800 TABLET, FILM COATED ORAL EVERY 6 HOURS PRN
Qty: 30 TABLET | Refills: 0 | Status: SHIPPED | OUTPATIENT
Start: 2025-03-12

## 2025-03-12 RX ORDER — HYDROCHLOROTHIAZIDE 25 MG/1
25 TABLET ORAL DAILY
Qty: 30 TABLET | Refills: 1 | Status: SHIPPED | OUTPATIENT
Start: 2025-03-12

## 2025-03-12 RX ORDER — LISINOPRIL 40 MG/1
40 TABLET ORAL DAILY
Qty: 30 TABLET | Refills: 1 | Status: SHIPPED | OUTPATIENT
Start: 2025-03-12

## 2025-03-12 NOTE — TELEPHONE ENCOUNTER
Caller: Abigail Jaimes    Relationship: Self    Best call back number:     [phone]       Requested Prescriptions:   Requested Prescriptions     Pending Prescriptions Disp Refills    loratadine (Claritin) 10 MG tablet 30 tablet 1     Sig: Take 1 tablet by mouth Daily.    lisinopril (PRINIVIL,ZESTRIL) 40 MG tablet 30 tablet 1     Sig: Take 1 tablet by mouth Daily.    hydroCHLOROthiazide 25 MG tablet 30 tablet 1     Sig: Take 1 tablet by mouth Daily.    Cholecalciferol (vitamin D3) 125 MCG (5000 UT) tablet tablet 30 tablet 1     Sig: Take 1 tablet by mouth Daily.        Pharmacy where request should be sent: [facility] [place] [place] [facility] [place] [address] [facility] [address] - [phone] [facility] [phone] FX     Last office visit with prescribing clinician: 2/6/2025   Last telemedicine visit with prescribing clinician: Visit date not found   Next office visit with prescribing clinician: Visit date not found     Additional details provided by patient:     Does the patient have less than a 3 day supply:  [] Yes  [] No    Would you like a call back once the refill request has been completed: [] Yes [] No    If the office needs to give you a call back, can they leave a voicemail: [] Yes [] No    Jac Nieto   03/12/25 10:03 EDT

## 2025-03-12 NOTE — TELEPHONE ENCOUNTER
Caller: Theodore Abigail N    Relationship: Self    Best call back number:     [phone]     Requested Prescriptions:   Requested Prescriptions     Pending Prescriptions Disp Refills    ibuprofen (ADVIL,MOTRIN) 800 MG tablet 30 tablet 0     Sig: Take 1 tablet by mouth Every 6 (Six) Hours As Needed for Moderate Pain.      Pharmacy where request should be sent: [facility] [place] [address] [facility] [address] - [phone] [facility] [phone] FX     Last office visit with prescribing clinician: 2/6/2025   Last telemedicine visit with prescribing clinician: Visit date not found   Next office visit with prescribing clinician: Visit date not found     Additional details provided by patient:     Does the patient have less than a 3 day supply:  [] Yes  [] No    Would you like a call back once the refill request has been completed: [] Yes [] No    If the office needs to give you a call back, can they leave a voicemail: [] Yes [] No    Jac Nieto Rep   03/12/25 09:57 EDT

## 2025-03-17 RX ORDER — LORATADINE 10 MG/1
10 TABLET ORAL DAILY
Qty: 30 TABLET | Refills: 1 | OUTPATIENT
Start: 2025-03-17

## 2025-03-17 RX ORDER — LISINOPRIL 40 MG/1
40 TABLET ORAL DAILY
Qty: 30 TABLET | Refills: 1 | OUTPATIENT
Start: 2025-03-17

## 2025-03-17 RX ORDER — IBUPROFEN 800 MG/1
800 TABLET, FILM COATED ORAL EVERY 6 HOURS PRN
Qty: 30 TABLET | Refills: 0 | OUTPATIENT
Start: 2025-03-17

## 2025-03-17 RX ORDER — HYDROCHLOROTHIAZIDE 25 MG/1
25 TABLET ORAL DAILY
Qty: 30 TABLET | Refills: 1 | OUTPATIENT
Start: 2025-03-17

## 2025-05-14 RX ORDER — HYDROCHLOROTHIAZIDE 25 MG/1
25 TABLET ORAL DAILY
Qty: 30 TABLET | Refills: 1 | Status: SHIPPED | OUTPATIENT
Start: 2025-05-14

## 2025-05-14 RX ORDER — LISINOPRIL 40 MG/1
40 TABLET ORAL DAILY
Qty: 30 TABLET | Refills: 1 | Status: SHIPPED | OUTPATIENT
Start: 2025-05-14

## 2025-05-14 RX ORDER — IBUPROFEN 800 MG/1
800 TABLET, FILM COATED ORAL EVERY 6 HOURS PRN
Qty: 30 TABLET | Refills: 1 | Status: SHIPPED | OUTPATIENT
Start: 2025-05-14

## 2025-05-14 RX ORDER — LORATADINE 10 MG/1
10 TABLET ORAL DAILY
Qty: 30 TABLET | Refills: 1 | Status: SHIPPED | OUTPATIENT
Start: 2025-05-14

## 2025-07-08 ENCOUNTER — OFFICE VISIT (OUTPATIENT)
Dept: FAMILY MEDICINE CLINIC | Facility: CLINIC | Age: 45
End: 2025-07-08
Payer: COMMERCIAL

## 2025-07-08 ENCOUNTER — LAB (OUTPATIENT)
Dept: LAB | Facility: HOSPITAL | Age: 45
End: 2025-07-08
Payer: COMMERCIAL

## 2025-07-08 VITALS
DIASTOLIC BLOOD PRESSURE: 51 MMHG | HEART RATE: 89 BPM | OXYGEN SATURATION: 98 % | BODY MASS INDEX: 43.4 KG/M2 | WEIGHT: 293 LBS | HEIGHT: 69 IN | SYSTOLIC BLOOD PRESSURE: 119 MMHG

## 2025-07-08 DIAGNOSIS — R73.03 PREDIABETES: ICD-10-CM

## 2025-07-08 DIAGNOSIS — E55.9 VITAMIN D DEFICIENCY: ICD-10-CM

## 2025-07-08 DIAGNOSIS — J30.9 ALLERGIC RHINITIS, UNSPECIFIED SEASONALITY, UNSPECIFIED TRIGGER: ICD-10-CM

## 2025-07-08 DIAGNOSIS — I10 HYPERTENSION, UNSPECIFIED TYPE: Primary | ICD-10-CM

## 2025-07-08 DIAGNOSIS — Z13.220 LIPID SCREENING: ICD-10-CM

## 2025-07-08 DIAGNOSIS — I10 HYPERTENSION, UNSPECIFIED TYPE: ICD-10-CM

## 2025-07-08 DIAGNOSIS — Z13.29 THYROID DISORDER SCREEN: ICD-10-CM

## 2025-07-08 LAB
25(OH)D3 SERPL-MCNC: 64.2 NG/ML (ref 30–100)
ALBUMIN SERPL-MCNC: 4 G/DL (ref 3.5–5.2)
ALBUMIN/GLOB SERPL: 1.3 G/DL
ALP SERPL-CCNC: 117 U/L (ref 39–117)
ALT SERPL W P-5'-P-CCNC: 29 U/L (ref 1–33)
ANION GAP SERPL CALCULATED.3IONS-SCNC: 8.1 MMOL/L (ref 5–15)
AST SERPL-CCNC: 34 U/L (ref 1–32)
BILIRUB SERPL-MCNC: 0.6 MG/DL (ref 0–1.2)
BUN SERPL-MCNC: 18 MG/DL (ref 6–20)
BUN/CREAT SERPL: 19.4 (ref 7–25)
CALCIUM SPEC-SCNC: 10 MG/DL (ref 8.6–10.5)
CHLORIDE SERPL-SCNC: 103 MMOL/L (ref 98–107)
CHOLEST SERPL-MCNC: 154 MG/DL (ref 0–200)
CO2 SERPL-SCNC: 27.9 MMOL/L (ref 22–29)
CREAT SERPL-MCNC: 0.93 MG/DL (ref 0.57–1)
EGFRCR SERPLBLD CKD-EPI 2021: 78.4 ML/MIN/1.73
GLOBULIN UR ELPH-MCNC: 3.1 GM/DL
GLUCOSE SERPL-MCNC: 83 MG/DL (ref 65–99)
HBA1C MFR BLD: 5.9 % (ref 4.8–5.6)
HDLC SERPL-MCNC: 34 MG/DL (ref 40–60)
LDLC SERPL CALC-MCNC: 105 MG/DL (ref 0–100)
LDLC/HDLC SERPL: 3.06 {RATIO}
POTASSIUM SERPL-SCNC: 3.7 MMOL/L (ref 3.5–5.2)
PROT SERPL-MCNC: 7.1 G/DL (ref 6–8.5)
SODIUM SERPL-SCNC: 139 MMOL/L (ref 136–145)
TRIGL SERPL-MCNC: 79 MG/DL (ref 0–150)
TSH SERPL DL<=0.05 MIU/L-ACNC: 3.58 UIU/ML (ref 0.27–4.2)
VLDLC SERPL-MCNC: 15 MG/DL (ref 5–40)

## 2025-07-08 PROCEDURE — 99214 OFFICE O/P EST MOD 30 MIN: CPT | Performed by: NURSE PRACTITIONER

## 2025-07-08 PROCEDURE — 36415 COLL VENOUS BLD VENIPUNCTURE: CPT

## 2025-07-08 PROCEDURE — 80053 COMPREHEN METABOLIC PANEL: CPT

## 2025-07-08 PROCEDURE — 82306 VITAMIN D 25 HYDROXY: CPT

## 2025-07-08 PROCEDURE — 80061 LIPID PANEL: CPT

## 2025-07-08 PROCEDURE — 83036 HEMOGLOBIN GLYCOSYLATED A1C: CPT

## 2025-07-08 PROCEDURE — 84443 ASSAY THYROID STIM HORMONE: CPT

## 2025-07-08 RX ORDER — HYDROCHLOROTHIAZIDE 25 MG/1
25 TABLET ORAL DAILY
Qty: 90 TABLET | Refills: 1 | Status: SHIPPED | OUTPATIENT
Start: 2025-07-08

## 2025-07-08 RX ORDER — LORATADINE 10 MG/1
10 TABLET ORAL DAILY
Qty: 90 TABLET | Refills: 1 | Status: SHIPPED | OUTPATIENT
Start: 2025-07-08

## 2025-07-08 RX ORDER — LISINOPRIL 40 MG/1
40 TABLET ORAL DAILY
Qty: 90 TABLET | Refills: 1 | Status: SHIPPED | OUTPATIENT
Start: 2025-07-08

## 2025-07-08 NOTE — PROGRESS NOTES
Chief Complaint  Hypertension    SUBJECTIVE  Abigail ROE Jaimes presents to [facility] to follow up on HTN and refills.     Patient has been erroneously marked as diabetic. Based on the available clinical information, she does not have diabetes and should therefore be excluded from diabetic health maintenance and quality measures for the remainder of the reporting period.      Patient here today to follow-up on chronic conditions including hypertension, allergies, prediabetes    Patient has been working hard on diet and exercise changes and has managed to lose some weight    History of Present Illness  Past Medical History:   Diagnosis Date    Anxiety     Depression     Hypertension     Varicose vein of leg       Family History   Problem Relation Age of Onset    Diabetes Mother     Cancer Father     COPD Sister     COPD Brother     COPD Maternal Grandmother     No Known Problems Maternal Grandfather     No Known Problems Paternal Grandmother     No Known Problems Paternal Grandfather     No Known Problems Daughter     No Known Problems Son     No Known Problems Cousin     No Known Problems Other     Rheum arthritis Neg Hx     Osteoarthritis Neg Hx     Asthma Neg Hx     Heart failure Neg Hx     Hyperlipidemia Neg Hx     Hypertension Neg Hx     Migraines Neg Hx     Rashes / Skin problems Neg Hx     Seizures Neg Hx     Stroke Neg Hx     Thyroid disease Neg Hx       Past Surgical History:   Procedure Laterality Date    DENTAL PROCEDURE      HEMORRHOIDECTOMY      HYSTERECTOMY  2013    HYSTERECTOMY      STOMACH SURGERY          Current Outpatient Medications:     Cholecalciferol (vitamin D3) 125 MCG (5000 UT) tablet tablet, Take 1 tablet by mouth Daily., Disp: 90 tablet, Rfl: 1    hydroCHLOROthiazide 25 MG tablet, Take 1 tablet by mouth Daily., Disp: 90 tablet, Rfl: 1    ibuprofen (ADVIL,MOTRIN) 800 MG tablet, Take 1 tablet by mouth Every 6 (Six) Hours As Needed for Moderate Pain., Disp: 30  "tablet, Rfl: 1    lisinopril (PRINIVIL,ZESTRIL) 40 MG tablet, Take 1 tablet by mouth Daily., Disp: 90 tablet, Rfl: 1    loratadine (Claritin) 10 MG tablet, Take 1 tablet by mouth Daily., Disp: 90 tablet, Rfl: 1    OBJECTIVE  Vital Signs:   /51   Pulse 89   Ht 175.3 cm (69\")   Wt (!) 137 kg (303 lb)   SpO2 98%   BMI 44.75 kg/m²    Estimated body mass index is 44.75 kg/m² as calculated from the following:    Height as of this encounter: 175.3 cm (69\").    Weight as of this encounter: 137 kg (303 lb).     Wt Readings from Last 3 Encounters:   07/08/25 (!) 137 kg (303 lb)   02/10/25 (!) 140 kg (308 lb)   02/06/25 (!) 140 kg (308 lb)     BP Readings from Last 3 Encounters:   07/08/25 119/51   02/10/25 131/83   02/06/25 125/82       Physical Exam  Vitals reviewed.   Constitutional:       Appearance: Normal appearance. She is well-developed.   HENT:      Head: Normocephalic and atraumatic.      Right Ear: External ear normal.      Left Ear: External ear normal.   Eyes:      Conjunctiva/sclera: Conjunctivae normal.      Pupils: Pupils are equal, round, and reactive to light.   Cardiovascular:      Rate and Rhythm: Normal rate and regular rhythm.      Heart sounds: No murmur heard.     No friction rub. No gallop.   Pulmonary:      Effort: Pulmonary effort is normal.      Breath sounds: Normal breath sounds. No wheezing or rhonchi.   Skin:     General: Skin is warm and dry.   Neurological:      Mental Status: She is alert and oriented to person, place, and time.      Cranial Nerves: No cranial nerve deficit.   Psychiatric:         Mood and Affect: Mood and affect normal.         Behavior: Behavior normal.         Thought Content: Thought content normal.         Judgment: Judgment normal.          Result Review    CMP          2/3/2025    18:16 2/6/2025    09:13   CMP   Glucose 126  101    BUN 18  21    Creatinine 1.10  0.96    EGFR 64.1  75.4    Sodium 138  140    Potassium 3.3  4.2    Chloride 100  104    Calcium " "9.6  9.3    Total Protein 7.8     Albumin 4.1     Globulin 3.7     Total Bilirubin 0.6     Alkaline Phosphatase 139     AST (SGOT) 23     ALT (SGPT) 25     Albumin/Globulin Ratio 1.1     BUN/Creatinine Ratio 16.4  21.9    Anion Gap 10.7  7.2      CBC          2/3/2025    18:16 2/6/2025    09:13   CBC   WBC 11.06  6.30    RBC 4.97  4.76    Hemoglobin 13.2  12.6    Hematocrit 42.1  40.5    MCV 84.7  85.1    MCH 26.6  26.5    MCHC 31.4  31.1    RDW 13.8  12.9    Platelets 275  276          No components found for: \"VFHV01MC\"    No Images in the past 120 days found..     The above data has been reviewed by ELISEO Miguel 07/08/2025 10:59 EDT.          Patient Care Team:  Shamika Mcdonald APRN as PCP - General (Nurse Practitioner)  [facility] (Pharmacy)            ASSESSMENT & PLAN    Diagnoses and all orders for this visit:    1. Hypertension, unspecified type (Primary)  Assessment & Plan:  Blood pressure in excellent control at present, continue current medication    Orders:  -     Comprehensive Metabolic Panel; Future  -     Lipid Panel; Future  -     Hemoglobin A1c; Future  -     hydroCHLOROthiazide 25 MG tablet; Take 1 tablet by mouth Daily.  Dispense: 90 tablet; Refill: 1  -     lisinopril (PRINIVIL,ZESTRIL) 40 MG tablet; Take 1 tablet by mouth Daily.  Dispense: 90 tablet; Refill: 1  -     Vitamin D 25 hydroxy; Future  -     TSH Rfx On Abnormal To Free T4; Future    2. Prediabetes  Overview:  Patient work on diet and exercise changes and successfully losing weight    Orders:  -     Hemoglobin A1c; Future    3. Lipid screening  -     Lipid Panel; Future    4. Vitamin D deficiency  -     Cholecalciferol (vitamin D3) 125 MCG (5000 UT) tablet tablet; Take 1 tablet by mouth Daily.  Dispense: 90 tablet; Refill: 1  -     Vitamin D 25 hydroxy; Future    5. Thyroid disorder screen  -     TSH Rfx On Abnormal To Free T4; Future    6. Allergic rhinitis, unspecified seasonality, unspecified " trigger  Overview:  Stable on Claritin, continue current medication    Orders:  -     loratadine (Claritin) 10 MG tablet; Take 1 tablet by mouth Daily.  Dispense: 90 tablet; Refill: 1         Tobacco Use: Medium Risk (7/8/2025)    Patient History     Smoking Tobacco Use: Former     Smokeless Tobacco Use: Never     Passive Exposure: Past       Follow Up     Return in about 6 months (around 1/8/2026), or if symptoms worsen or fail to improve.        Patient was given instructions and counseling regarding her condition or for health maintenance advice. Please see specific information pulled into the AVS if appropriate.   I have reviewed information obtained and documented by others and I have confirmed the accuracy of this documented note.    ELISEO Miguel

## 2025-07-09 ENCOUNTER — RESULTS FOLLOW-UP (OUTPATIENT)
Dept: FAMILY MEDICINE CLINIC | Facility: CLINIC | Age: 45
End: 2025-07-09
Payer: COMMERCIAL

## 2025-07-09 DIAGNOSIS — R79.89 ELEVATED LFTS: Primary | ICD-10-CM

## 2025-08-10 ENCOUNTER — APPOINTMENT (OUTPATIENT)
Dept: GENERAL RADIOLOGY | Facility: HOSPITAL | Age: 45
End: 2025-08-10
Payer: COMMERCIAL

## 2025-08-10 ENCOUNTER — HOSPITAL ENCOUNTER (EMERGENCY)
Facility: HOSPITAL | Age: 45
Discharge: HOME | End: 2025-08-10
Attending: EMERGENCY MEDICINE | Admitting: EMERGENCY MEDICINE
Payer: COMMERCIAL

## 2025-08-10 VITALS
DIASTOLIC BLOOD PRESSURE: 89 MMHG | BODY MASS INDEX: 43.4 KG/M2 | SYSTOLIC BLOOD PRESSURE: 119 MMHG | OXYGEN SATURATION: 98 % | RESPIRATION RATE: 17 BRPM | WEIGHT: 293 LBS | HEIGHT: 69 IN | HEART RATE: 85 BPM | TEMPERATURE: 97.8 F

## 2025-08-10 DIAGNOSIS — M79.644 FINGER PAIN, RIGHT: Primary | ICD-10-CM

## 2025-08-10 PROCEDURE — 73130 X-RAY EXAM OF HAND: CPT

## 2025-08-10 PROCEDURE — 96372 THER/PROPH/DIAG INJ SC/IM: CPT

## 2025-08-10 PROCEDURE — 99283 EMERGENCY DEPT VISIT LOW MDM: CPT

## 2025-08-10 PROCEDURE — 25010000002 KETOROLAC TROMETHAMINE PER 15 MG

## 2025-08-10 RX ORDER — HYDROCODONE BITARTRATE AND ACETAMINOPHEN 5; 325 MG/1; MG/1
1 TABLET ORAL ONCE
Refills: 0 | Status: COMPLETED | OUTPATIENT
Start: 2025-08-10 | End: 2025-08-10

## 2025-08-10 RX ORDER — IBUPROFEN 800 MG/1
800 TABLET, FILM COATED ORAL EVERY 6 HOURS PRN
Qty: 20 TABLET | Refills: 0 | Status: SHIPPED | OUTPATIENT
Start: 2025-08-10 | End: 2025-08-12

## 2025-08-10 RX ORDER — KETOROLAC TROMETHAMINE 30 MG/ML
60 INJECTION, SOLUTION INTRAMUSCULAR; INTRAVENOUS ONCE
Status: COMPLETED | OUTPATIENT
Start: 2025-08-10 | End: 2025-08-10

## 2025-08-10 RX ADMIN — KETOROLAC TROMETHAMINE 60 MG: 30 INJECTION, SOLUTION INTRAMUSCULAR at 11:14

## 2025-08-10 RX ADMIN — HYDROCODONE BITARTRATE AND ACETAMINOPHEN 1 TABLET: 5; 325 TABLET ORAL at 12:34

## 2025-08-11 ENCOUNTER — TELEPHONE (OUTPATIENT)
Dept: FAMILY MEDICINE CLINIC | Facility: CLINIC | Age: 45
End: 2025-08-11
Payer: COMMERCIAL

## 2025-08-12 ENCOUNTER — OFFICE VISIT (OUTPATIENT)
Dept: FAMILY MEDICINE CLINIC | Facility: CLINIC | Age: 45
End: 2025-08-12
Payer: COMMERCIAL

## 2025-08-12 ENCOUNTER — LAB (OUTPATIENT)
Dept: LAB | Facility: HOSPITAL | Age: 45
End: 2025-08-12
Payer: COMMERCIAL

## 2025-08-12 VITALS
HEART RATE: 95 BPM | HEIGHT: 69 IN | WEIGHT: 293 LBS | DIASTOLIC BLOOD PRESSURE: 86 MMHG | OXYGEN SATURATION: 100 % | SYSTOLIC BLOOD PRESSURE: 117 MMHG | BODY MASS INDEX: 43.4 KG/M2

## 2025-08-12 DIAGNOSIS — I10 HYPERTENSION, UNSPECIFIED TYPE: ICD-10-CM

## 2025-08-12 DIAGNOSIS — M25.541 ARTHRALGIA OF RIGHT HAND: Primary | ICD-10-CM

## 2025-08-12 DIAGNOSIS — R79.89 ELEVATED LFTS: ICD-10-CM

## 2025-08-12 DIAGNOSIS — M25.541 ARTHRALGIA OF RIGHT HAND: ICD-10-CM

## 2025-08-12 LAB
BASOPHILS # BLD AUTO: 0.02 10*3/MM3 (ref 0–0.2)
BASOPHILS NFR BLD AUTO: 0.4 % (ref 0–1.5)
CHROMATIN AB SERPL-ACNC: <10 IU/ML (ref 0–14)
CK SERPL-CCNC: 63 U/L (ref 20–180)
CRP SERPL-MCNC: 3.41 MG/DL (ref 0–0.5)
DEPRECATED RDW RBC AUTO: 43.5 FL (ref 37–54)
EOSINOPHIL # BLD AUTO: 0.18 10*3/MM3 (ref 0–0.4)
EOSINOPHIL NFR BLD AUTO: 3.2 % (ref 0.3–6.2)
ERYTHROCYTE [DISTWIDTH] IN BLOOD BY AUTOMATED COUNT: 14.4 % (ref 12.3–15.4)
ERYTHROCYTE [SEDIMENTATION RATE] IN BLOOD: 22 MM/HR (ref 0–20)
HAV IGM SERPL QL IA: NORMAL
HBV CORE IGM SERPL QL IA: NORMAL
HBV SURFACE AG SERPL QL IA: NORMAL
HCT VFR BLD AUTO: 40.4 % (ref 34–46.6)
HCV AB SER QL: NORMAL
HGB BLD-MCNC: 12.7 G/DL (ref 12–15.9)
IMM GRANULOCYTES # BLD AUTO: 0.01 10*3/MM3 (ref 0–0.05)
IMM GRANULOCYTES NFR BLD AUTO: 0.2 % (ref 0–0.5)
LYMPHOCYTES # BLD AUTO: 1.56 10*3/MM3 (ref 0.7–3.1)
LYMPHOCYTES NFR BLD AUTO: 27.6 % (ref 19.6–45.3)
MCH RBC QN AUTO: 26.1 PG (ref 26.6–33)
MCHC RBC AUTO-ENTMCNC: 31.4 G/DL (ref 31.5–35.7)
MCV RBC AUTO: 83 FL (ref 79–97)
MONOCYTES # BLD AUTO: 0.43 10*3/MM3 (ref 0.1–0.9)
MONOCYTES NFR BLD AUTO: 7.6 % (ref 5–12)
NEUTROPHILS NFR BLD AUTO: 3.46 10*3/MM3 (ref 1.7–7)
NEUTROPHILS NFR BLD AUTO: 61 % (ref 42.7–76)
NRBC BLD AUTO-RTO: 0 /100 WBC (ref 0–0.2)
PLATELET # BLD AUTO: 258 10*3/MM3 (ref 140–450)
PMV BLD AUTO: 12 FL (ref 6–12)
RBC # BLD AUTO: 4.87 10*6/MM3 (ref 3.77–5.28)
WBC NRBC COR # BLD AUTO: 5.66 10*3/MM3 (ref 3.4–10.8)

## 2025-08-12 PROCEDURE — 84550 ASSAY OF BLOOD/URIC ACID: CPT

## 2025-08-12 PROCEDURE — 82550 ASSAY OF CK (CPK): CPT

## 2025-08-12 PROCEDURE — 86140 C-REACTIVE PROTEIN: CPT

## 2025-08-12 PROCEDURE — 86038 ANTINUCLEAR ANTIBODIES: CPT

## 2025-08-12 PROCEDURE — 86200 CCP ANTIBODY: CPT

## 2025-08-12 PROCEDURE — 85025 COMPLETE CBC W/AUTO DIFF WBC: CPT

## 2025-08-12 PROCEDURE — 80074 ACUTE HEPATITIS PANEL: CPT

## 2025-08-12 PROCEDURE — 99214 OFFICE O/P EST MOD 30 MIN: CPT | Performed by: NURSE PRACTITIONER

## 2025-08-12 PROCEDURE — 36415 COLL VENOUS BLD VENIPUNCTURE: CPT

## 2025-08-12 PROCEDURE — 85652 RBC SED RATE AUTOMATED: CPT

## 2025-08-12 PROCEDURE — 86431 RHEUMATOID FACTOR QUANT: CPT

## 2025-08-12 RX ORDER — DICLOFENAC SODIUM 75 MG/1
75 TABLET, DELAYED RELEASE ORAL 2 TIMES DAILY
Qty: 30 TABLET | Refills: 0 | Status: SHIPPED | OUTPATIENT
Start: 2025-08-12

## 2025-08-12 RX ORDER — METHYLPREDNISOLONE 4 MG/1
TABLET ORAL
Qty: 1 EACH | Refills: 0 | Status: SHIPPED | OUTPATIENT
Start: 2025-08-12 | End: 2025-08-18

## 2025-08-13 ENCOUNTER — TELEPHONE (OUTPATIENT)
Dept: FAMILY MEDICINE CLINIC | Facility: CLINIC | Age: 45
End: 2025-08-13
Payer: COMMERCIAL

## 2025-08-13 ENCOUNTER — HOSPITAL ENCOUNTER (EMERGENCY)
Facility: HOSPITAL | Age: 45
Discharge: HOME | End: 2025-08-13
Attending: EMERGENCY MEDICINE
Payer: COMMERCIAL

## 2025-08-13 VITALS
TEMPERATURE: 98 F | BODY MASS INDEX: 43.71 KG/M2 | RESPIRATION RATE: 16 BRPM | SYSTOLIC BLOOD PRESSURE: 111 MMHG | OXYGEN SATURATION: 99 % | HEIGHT: 69 IN | HEART RATE: 82 BPM | DIASTOLIC BLOOD PRESSURE: 72 MMHG

## 2025-08-13 DIAGNOSIS — M25.541 ARTHRALGIA OF HAND, RIGHT: Primary | ICD-10-CM

## 2025-08-13 DIAGNOSIS — E55.9 VITAMIN D DEFICIENCY: ICD-10-CM

## 2025-08-13 PROCEDURE — 99283 EMERGENCY DEPT VISIT LOW MDM: CPT

## 2025-08-13 PROCEDURE — 25010000002 MORPHINE PER 10 MG: Performed by: NURSE PRACTITIONER

## 2025-08-13 PROCEDURE — 96372 THER/PROPH/DIAG INJ SC/IM: CPT

## 2025-08-13 PROCEDURE — 63710000001 ONDANSETRON ODT 4 MG TABLET DISPERSIBLE: Performed by: NURSE PRACTITIONER

## 2025-08-13 RX ORDER — COLCHICINE 0.6 MG/1
1.2 TABLET ORAL ONCE
Status: COMPLETED | OUTPATIENT
Start: 2025-08-13 | End: 2025-08-13

## 2025-08-13 RX ORDER — CEPHALEXIN 500 MG/1
500 CAPSULE ORAL 4 TIMES DAILY
Qty: 20 CAPSULE | Refills: 0 | Status: SHIPPED | OUTPATIENT
Start: 2025-08-13 | End: 2025-08-18

## 2025-08-13 RX ORDER — ONDANSETRON 4 MG/1
4 TABLET, ORALLY DISINTEGRATING ORAL ONCE
Status: COMPLETED | OUTPATIENT
Start: 2025-08-13 | End: 2025-08-13

## 2025-08-13 RX ORDER — MORPHINE SULFATE 2 MG/ML
4 INJECTION, SOLUTION INTRAMUSCULAR; INTRAVENOUS ONCE
Status: COMPLETED | OUTPATIENT
Start: 2025-08-13 | End: 2025-08-13

## 2025-08-13 RX ORDER — COLCHICINE 0.6 MG/1
0.6 TABLET ORAL 2 TIMES DAILY
Qty: 4 TABLET | Refills: 0 | Status: SHIPPED | OUTPATIENT
Start: 2025-08-13 | End: 2025-08-15

## 2025-08-13 RX ORDER — COLCHICINE 0.6 MG/1
0.6 TABLET ORAL ONCE
Status: COMPLETED | OUTPATIENT
Start: 2025-08-13 | End: 2025-08-13

## 2025-08-13 RX ADMIN — ONDANSETRON 4 MG: 4 TABLET, ORALLY DISINTEGRATING ORAL at 13:06

## 2025-08-13 RX ADMIN — COLCHICINE 0.6 MG: 0.6 TABLET ORAL at 14:14

## 2025-08-13 RX ADMIN — MORPHINE SULFATE 4 MG: 2 INJECTION, SOLUTION INTRAMUSCULAR; INTRAVENOUS at 13:12

## 2025-08-13 RX ADMIN — COLCHICINE 1.2 MG: 0.6 TABLET ORAL at 13:09

## 2025-08-14 DIAGNOSIS — M25.541 ARTHRALGIA OF RIGHT HAND: Primary | ICD-10-CM

## 2025-08-14 LAB
ANA SER QL: NEGATIVE
CCP IGA+IGG SERPL IA-ACNC: 4 UNITS (ref 0–19)
URATE SERPL-MCNC: 5.6 MG/DL (ref 2.4–5.7)

## 2025-08-18 ENCOUNTER — OFFICE VISIT (OUTPATIENT)
Dept: FAMILY MEDICINE CLINIC | Facility: CLINIC | Age: 45
End: 2025-08-18
Payer: COMMERCIAL

## 2025-08-18 VITALS
SYSTOLIC BLOOD PRESSURE: 103 MMHG | BODY MASS INDEX: 43.4 KG/M2 | HEIGHT: 69 IN | HEART RATE: 94 BPM | DIASTOLIC BLOOD PRESSURE: 75 MMHG | WEIGHT: 293 LBS | OXYGEN SATURATION: 98 %

## 2025-08-18 DIAGNOSIS — R20.0 NUMBNESS OF FINGER: Primary | ICD-10-CM

## 2025-08-18 DIAGNOSIS — M79.644 PAIN OF FINGER OF RIGHT HAND: ICD-10-CM

## 2025-08-18 PROCEDURE — 99213 OFFICE O/P EST LOW 20 MIN: CPT | Performed by: NURSE PRACTITIONER

## 2025-08-24 DIAGNOSIS — E55.9 VITAMIN D DEFICIENCY: ICD-10-CM

## 2025-08-25 ENCOUNTER — TELEPHONE (OUTPATIENT)
Dept: FAMILY MEDICINE CLINIC | Facility: CLINIC | Age: 45
End: 2025-08-25
Payer: COMMERCIAL

## 2025-08-27 ENCOUNTER — OFFICE VISIT (OUTPATIENT)
Dept: ORTHOPEDIC SURGERY | Facility: CLINIC | Age: 45
End: 2025-08-27
Payer: COMMERCIAL

## 2025-08-27 VITALS
OXYGEN SATURATION: 97 % | HEIGHT: 69 IN | DIASTOLIC BLOOD PRESSURE: 94 MMHG | BODY MASS INDEX: 43.4 KG/M2 | HEART RATE: 96 BPM | SYSTOLIC BLOOD PRESSURE: 139 MMHG | WEIGHT: 293 LBS

## 2025-08-27 DIAGNOSIS — M65.341 TRIGGER RING FINGER OF RIGHT HAND: Primary | ICD-10-CM

## 2025-08-27 RX ORDER — METHYLPREDNISOLONE 4 MG/1
1 TABLET ORAL DAILY
Qty: 21 TABLET | Refills: 0 | Status: SHIPPED | OUTPATIENT
Start: 2025-08-27

## 2025-08-27 RX ADMIN — LIDOCAINE HYDROCHLORIDE 1 ML: 10 INJECTION, SOLUTION INFILTRATION; PERINEURAL at 08:15

## 2025-08-27 RX ADMIN — TRIAMCINOLONE ACETONIDE 40 MG: 40 INJECTION, SUSPENSION INTRA-ARTICULAR; INTRAMUSCULAR at 08:15

## 2025-08-28 ENCOUNTER — TELEPHONE (OUTPATIENT)
Dept: ORTHOPEDIC SURGERY | Facility: CLINIC | Age: 45
End: 2025-08-28
Payer: COMMERCIAL

## 2025-08-28 RX ORDER — LIDOCAINE HYDROCHLORIDE 10 MG/ML
1 INJECTION, SOLUTION INFILTRATION; PERINEURAL
Status: COMPLETED | OUTPATIENT
Start: 2025-08-27 | End: 2025-08-27

## 2025-08-28 RX ORDER — TRIAMCINOLONE ACETONIDE 40 MG/ML
40 INJECTION, SUSPENSION INTRA-ARTICULAR; INTRAMUSCULAR
Status: COMPLETED | OUTPATIENT
Start: 2025-08-27 | End: 2025-08-27